# Patient Record
Sex: FEMALE | Race: WHITE | Employment: UNEMPLOYED | URBAN - METROPOLITAN AREA
[De-identification: names, ages, dates, MRNs, and addresses within clinical notes are randomized per-mention and may not be internally consistent; named-entity substitution may affect disease eponyms.]

---

## 2024-11-15 ENCOUNTER — HOSPITAL ENCOUNTER (INPATIENT)
Age: 29
LOS: 8 days | Discharge: HOME | DRG: 885 | End: 2024-11-23
Attending: STUDENT IN AN ORGANIZED HEALTH CARE EDUCATION/TRAINING PROGRAM | Admitting: PSYCHIATRY & NEUROLOGY
Payer: MEDICARE

## 2024-11-15 DIAGNOSIS — R45.851 SUICIDAL IDEATION: Primary | ICD-10-CM

## 2024-11-15 PROBLEM — F25.9 SCHIZO AFFECTIVE SCHIZOPHRENIA (HCC): Status: ACTIVE | Noted: 2024-11-15

## 2024-11-15 LAB
ALBUMIN SERPL-MCNC: 4 G/DL (ref 3.5–5.2)
ALP SERPL-CCNC: 63 U/L (ref 35–104)
ALT SERPL-CCNC: 54 U/L (ref 0–32)
AMPHET UR QL SCN: NEGATIVE
ANION GAP SERPL CALCULATED.3IONS-SCNC: 6 MMOL/L (ref 7–16)
APAP SERPL-MCNC: <5 UG/ML (ref 10–30)
AST SERPL-CCNC: 19 U/L (ref 0–31)
BACTERIA URNS QL MICRO: ABNORMAL
BARBITURATES UR QL SCN: NEGATIVE
BASOPHILS # BLD: 0.05 K/UL (ref 0–0.2)
BASOPHILS NFR BLD: 1 % (ref 0–2)
BENZODIAZ UR QL: NEGATIVE
BILIRUB DIRECT SERPL-MCNC: <0.2 MG/DL (ref 0–0.3)
BILIRUB INDIRECT SERPL-MCNC: ABNORMAL MG/DL (ref 0–1)
BILIRUB SERPL-MCNC: 0.2 MG/DL (ref 0–1.2)
BILIRUB UR QL STRIP: NEGATIVE
BUN SERPL-MCNC: 6 MG/DL (ref 6–20)
BUPRENORPHINE UR QL: NEGATIVE
CALCIUM SERPL-MCNC: 9 MG/DL (ref 8.6–10.2)
CANNABINOIDS UR QL SCN: NEGATIVE
CHLORIDE SERPL-SCNC: 104 MMOL/L (ref 98–107)
CLARITY UR: ABNORMAL
CO2 SERPL-SCNC: 30 MMOL/L (ref 22–29)
COCAINE UR QL SCN: NEGATIVE
COLOR UR: YELLOW
CREAT SERPL-MCNC: 0.7 MG/DL (ref 0.5–1)
EOSINOPHIL # BLD: 0.45 K/UL (ref 0.05–0.5)
EOSINOPHILS RELATIVE PERCENT: 6 % (ref 0–6)
EPI CELLS #/AREA URNS HPF: ABNORMAL /HPF
ERYTHROCYTE [DISTWIDTH] IN BLOOD BY AUTOMATED COUNT: 13.8 % (ref 11.5–15)
ETHANOLAMINE SERPL-MCNC: <10 MG/DL (ref 0–0.08)
FENTANYL UR QL: NEGATIVE
GFR, ESTIMATED: >90 ML/MIN/1.73M2
GLUCOSE SERPL-MCNC: 88 MG/DL (ref 74–99)
GLUCOSE UR STRIP-MCNC: NEGATIVE MG/DL
HCG, URINE, POC: NEGATIVE
HCT VFR BLD AUTO: 43.3 % (ref 34–48)
HGB BLD-MCNC: 14.1 G/DL (ref 11.5–15.5)
HGB UR QL STRIP.AUTO: ABNORMAL
IMM GRANULOCYTES # BLD AUTO: <0.03 K/UL (ref 0–0.58)
IMM GRANULOCYTES NFR BLD: 0 % (ref 0–5)
KETONES UR STRIP-MCNC: NEGATIVE MG/DL
LEUKOCYTE ESTERASE UR QL STRIP: NEGATIVE
LYMPHOCYTES NFR BLD: 3.21 K/UL (ref 1.5–4)
LYMPHOCYTES RELATIVE PERCENT: 43 % (ref 20–42)
Lab: NORMAL
MCH RBC QN AUTO: 28.5 PG (ref 26–35)
MCHC RBC AUTO-ENTMCNC: 32.6 G/DL (ref 32–34.5)
MCV RBC AUTO: 87.5 FL (ref 80–99.9)
METHADONE UR QL: NEGATIVE
MONOCYTES NFR BLD: 0.49 K/UL (ref 0.1–0.95)
MONOCYTES NFR BLD: 7 % (ref 2–12)
NEGATIVE QC PASS/FAIL: NORMAL
NEUTROPHILS NFR BLD: 43 % (ref 43–80)
NEUTS SEG NFR BLD: 3.21 K/UL (ref 1.8–7.3)
NITRITE UR QL STRIP: NEGATIVE
OPIATES UR QL SCN: NEGATIVE
OXYCODONE UR QL SCN: NEGATIVE
PCP UR QL SCN: NEGATIVE
PH UR STRIP: 7 [PH] (ref 5–9)
PLATELET # BLD AUTO: 196 K/UL (ref 130–450)
PMV BLD AUTO: 12.2 FL (ref 7–12)
POSITIVE QC PASS/FAIL: NORMAL
POTASSIUM SERPL-SCNC: 3.7 MMOL/L (ref 3.5–5)
PROT SERPL-MCNC: 6.1 G/DL (ref 6.4–8.3)
PROT UR STRIP-MCNC: NEGATIVE MG/DL
RBC # BLD AUTO: 4.95 M/UL (ref 3.5–5.5)
RBC #/AREA URNS HPF: ABNORMAL /HPF
SALICYLATES SERPL-MCNC: <0.3 MG/DL (ref 0–30)
SODIUM SERPL-SCNC: 140 MMOL/L (ref 132–146)
SP GR UR STRIP: 1.01 (ref 1–1.03)
TEST INFORMATION: NORMAL
TOXIC TRICYCLIC SC,BLOOD: NEGATIVE
UROBILINOGEN UR STRIP-ACNC: 0.2 EU/DL (ref 0–1)
WBC #/AREA URNS HPF: ABNORMAL /HPF
WBC OTHER # BLD: 7.4 K/UL (ref 4.5–11.5)

## 2024-11-15 PROCEDURE — 80179 DRUG ASSAY SALICYLATE: CPT

## 2024-11-15 PROCEDURE — 80307 DRUG TEST PRSMV CHEM ANLYZR: CPT

## 2024-11-15 PROCEDURE — 80053 COMPREHEN METABOLIC PANEL: CPT

## 2024-11-15 PROCEDURE — G0480 DRUG TEST DEF 1-7 CLASSES: HCPCS

## 2024-11-15 PROCEDURE — 85025 COMPLETE CBC W/AUTO DIFF WBC: CPT

## 2024-11-15 PROCEDURE — 6370000000 HC RX 637 (ALT 250 FOR IP): Performed by: NURSE PRACTITIONER

## 2024-11-15 PROCEDURE — 93005 ELECTROCARDIOGRAM TRACING: CPT

## 2024-11-15 PROCEDURE — 6370000000 HC RX 637 (ALT 250 FOR IP): Performed by: STUDENT IN AN ORGANIZED HEALTH CARE EDUCATION/TRAINING PROGRAM

## 2024-11-15 PROCEDURE — 80143 DRUG ASSAY ACETAMINOPHEN: CPT

## 2024-11-15 PROCEDURE — 81001 URINALYSIS AUTO W/SCOPE: CPT

## 2024-11-15 PROCEDURE — 99285 EMERGENCY DEPT VISIT HI MDM: CPT

## 2024-11-15 PROCEDURE — 90792 PSYCH DIAG EVAL W/MED SRVCS: CPT | Performed by: NURSE PRACTITIONER

## 2024-11-15 PROCEDURE — 1240000000 HC EMOTIONAL WELLNESS R&B

## 2024-11-15 PROCEDURE — 82248 BILIRUBIN DIRECT: CPT

## 2024-11-15 RX ORDER — HALOPERIDOL 5 MG/ML
5 INJECTION INTRAMUSCULAR EVERY 6 HOURS PRN
Status: DISCONTINUED | OUTPATIENT
Start: 2024-11-15 | End: 2024-11-16

## 2024-11-15 RX ORDER — LORAZEPAM 1 MG/1
2 TABLET ORAL ONCE
Status: COMPLETED | OUTPATIENT
Start: 2024-11-15 | End: 2024-11-15

## 2024-11-15 RX ORDER — HYDROXYZINE HYDROCHLORIDE 50 MG/1
50 TABLET, FILM COATED ORAL 3 TIMES DAILY PRN
Status: DISCONTINUED | OUTPATIENT
Start: 2024-11-15 | End: 2024-11-16

## 2024-11-15 RX ORDER — HALOPERIDOL 5 MG/1
5 TABLET ORAL EVERY 6 HOURS PRN
Status: DISCONTINUED | OUTPATIENT
Start: 2024-11-15 | End: 2024-11-16

## 2024-11-15 RX ORDER — MAGNESIUM HYDROXIDE/ALUMINUM HYDROXICE/SIMETHICONE 120; 1200; 1200 MG/30ML; MG/30ML; MG/30ML
30 SUSPENSION ORAL PRN
Status: DISCONTINUED | OUTPATIENT
Start: 2024-11-15 | End: 2024-11-16

## 2024-11-15 RX ORDER — ACETAMINOPHEN 325 MG/1
650 TABLET ORAL EVERY 6 HOURS PRN
Status: DISCONTINUED | OUTPATIENT
Start: 2024-11-15 | End: 2024-11-16

## 2024-11-15 RX ORDER — NICOTINE 21 MG/24HR
1 PATCH, TRANSDERMAL 24 HOURS TRANSDERMAL DAILY
Status: DISCONTINUED | OUTPATIENT
Start: 2024-11-16 | End: 2024-11-17

## 2024-11-15 RX ORDER — NICOTINE 21 MG/24HR
1 PATCH, TRANSDERMAL 24 HOURS TRANSDERMAL ONCE
Status: COMPLETED | OUTPATIENT
Start: 2024-11-15 | End: 2024-11-16

## 2024-11-15 RX ADMIN — ACETAMINOPHEN 650 MG: 325 TABLET ORAL at 16:37

## 2024-11-15 RX ADMIN — LORAZEPAM 2 MG: 1 TABLET ORAL at 14:18

## 2024-11-15 ASSESSMENT — PAIN - FUNCTIONAL ASSESSMENT: PAIN_FUNCTIONAL_ASSESSMENT: NONE - DENIES PAIN

## 2024-11-15 ASSESSMENT — LIFESTYLE VARIABLES
HOW OFTEN DO YOU HAVE A DRINK CONTAINING ALCOHOL: NEVER
HOW MANY STANDARD DRINKS CONTAINING ALCOHOL DO YOU HAVE ON A TYPICAL DAY: PATIENT DOES NOT DRINK

## 2024-11-15 NOTE — VIRTUAL HEALTH
Jayne Tate, was evaluated through a synchronous (real-time) audio-video encounter. The patient (and/or guardian if applicable) is aware that this is a billable service, which includes applicable co-pays. This virtual visit was conducted with patient's (and/or legal guardian's) consent. Patient identification was verified, and a caregiver was present when appropriate.  The patient was located at Facility (Appt Department): [facility] EMERGENCY DEPARTMENT  [address] [id number]  Loc: [phone]  The provider was located at Home (City/State): [place]   Confirm you are appropriately licensed, registered, or certified to deliver care in the state where the patient is located as indicated above. If you are not or unsure, please re-schedule the visit: Yes, I confirm.   "[employer] Consult to Tele-Psych  Consult performed by: Elena [place], DARIA - CNP  Consult ordered by: Eliu Emerson MD Kristin Winklevoss  [id number]  1995     EMERGENCY DEPARTMENT TELEPSYCHIATRY EVALUATION    11/15/24    Chief Complaint:  “I am not doing well ”  HPI: Patient is a 29 y.o.  female who presents for psychiatric evaluation. Patient presented to the ED on 11/15/24 from [facility]. The patient states\" I am doing not very well. I fell like I am losing my mind. I am suicidal. My meds are not working, I think they need adjusted.\" The patient reports she feels like her medications have not been working for the past month. She has had suicidal thoughts for the past week. She endorses a plan to use her brothers gun to shoot herself. She states she knows where his gun is and has access to it. The patient reports a history of schizoaffective disorder, bipolar type. She receives the invega injection and her next injection is on December 5th. She is also prescribed Trazodone and Zoloft. The patient is unable to state the name of her

## 2024-11-15 NOTE — ED NOTES
Call to [facility] [phone].  Spoke to Shelley.  She authorized bed days.

## 2024-11-15 NOTE — ED NOTES
Call to [facility] [phone] - [place] med[place] Utilization review regarding patient's Medicaid.  Spoke to Delisa who reported that the patient's Medicaid is not eligible any longer and has not been eligible since March 2022.  Patient admits she has not applied for Medicaid recently.  Call to [facility] [phone].  Spoke to Quinn who stated someone will call back.

## 2024-11-15 NOTE — ED NOTES
Call to [facility] and spoke to Mary regarding the auth for bed days.  She stated that there will not be any female beds this weekend.

## 2024-11-15 NOTE — ED NOTES
Paperwork faxed to [facility] crisis at [phone].  Also [facility] fax[facility] at [phone].  Call [facility] and spoke to Mary.  She reported that she got the paperwork and she would start working the patient up while awaiting authorization from [facility].  She also reported that there are no female beds and there probably will not be any female beds until Monday morning.

## 2024-11-16 PROCEDURE — 90791 PSYCH DIAGNOSTIC EVALUATION: CPT | Performed by: SOCIAL WORKER

## 2024-11-16 PROCEDURE — 6370000000 HC RX 637 (ALT 250 FOR IP): Performed by: NURSE PRACTITIONER

## 2024-11-16 PROCEDURE — 1240000000 HC EMOTIONAL WELLNESS R&B

## 2024-11-16 RX ADMIN — HYDROXYZINE HYDROCHLORIDE 50 MG: 50 TABLET, FILM COATED ORAL at 11:48

## 2024-11-16 NOTE — ED NOTES
PTS FAMILY MEMBER JIMENA # [phone] CALLED REQUESTING INFORMATION INFORMED WILL HAVE PT CALL HER BACK TO DISCUSS

## 2024-11-16 NOTE — VIRTUAL HEALTH
Telepsych  Crisis 24-Hour Reassessment       Chief Complaint: Sucidal Ideation    C-SSRS Screening Completed: Completed      Current Suicide Risk (Low, Moderate, High): High    Mental Status Exam:     Sensorium  oriented to time, place and person   Orientation person, place, time/date, and situation   Relations cooperative   Eye Contact appropriate   Appearance:  age appropriate   Motor Behavior:  within normal limits   Speech:  soft   Vocabulary average   Thought Process: rapid   Thought Content suicidal   Suicidal ideations plan and intention   Homicidal ideations plan and intention   Mood:  anxious   Affect:  normal and mood-congruent   Memory recent  adequate   Memory remote:  adequate   Concentration:  adequate   Abstraction:  concrete   Insight:  fair   Reliability poor   Judgment:  limited       Updated Collateral: no new collateral      Brief Summary:Pt presenting with suicidal thoughts with plan to shoot herself with her brothers gun. She admits to feeling like her medications have not been working for the past month. She has been feeling suicidal for the past week.     Writer talked to the patient, patein reported that she is still feeling worse today than she felt yesterday, and reported that she is not able to keep herself safe. Patient reported that her Sucidal ideation has become to much.     Updated Level of Care/Disposition:  placement accepted, bed pending.    Safety Plan (Developed/Reviewed):  completed     Dx:   Sucidal ideation, homicidal ideation     Jayne Ybarra, was evaluated through a synchronous (real-time) audio-video encounter. The patient (and/or guardian if applicable) is aware that this is a billable service, which includes applicable co-pays. This virtual visit was conducted with patient's (and/or legal guardian's) consent. Patient identification was verified, and a caregiver was present when appropriate.  The patient was located at Facility (Appt Department): [facility]

## 2024-11-17 PROCEDURE — 90791 PSYCH DIAGNOSTIC EVALUATION: CPT | Performed by: SOCIAL WORKER

## 2024-11-17 PROCEDURE — 6370000000 HC RX 637 (ALT 250 FOR IP): Performed by: EMERGENCY MEDICINE

## 2024-11-17 PROCEDURE — 6370000000 HC RX 637 (ALT 250 FOR IP): Performed by: STUDENT IN AN ORGANIZED HEALTH CARE EDUCATION/TRAINING PROGRAM

## 2024-11-17 PROCEDURE — 1240000000 HC EMOTIONAL WELLNESS R&B

## 2024-11-17 RX ORDER — POLYETHYLENE GLYCOL 3350 17 G
2 POWDER IN PACKET (EA) ORAL
Status: DISCONTINUED | OUTPATIENT
Start: 2024-11-17 | End: 2024-11-23 | Stop reason: HOSPADM

## 2024-11-17 RX ORDER — LORAZEPAM 0.5 MG/1
0.5 TABLET ORAL ONCE
Status: COMPLETED | OUTPATIENT
Start: 2024-11-17 | End: 2024-11-17

## 2024-11-17 RX ORDER — ACETAMINOPHEN 325 MG/1
650 TABLET ORAL ONCE
Status: COMPLETED | OUTPATIENT
Start: 2024-11-17 | End: 2024-11-17

## 2024-11-17 RX ORDER — HYDROXYZINE PAMOATE 50 MG/1
50 CAPSULE ORAL ONCE
Status: COMPLETED | OUTPATIENT
Start: 2024-11-17 | End: 2024-11-17

## 2024-11-17 RX ORDER — NICOTINE 21 MG/24HR
1 PATCH, TRANSDERMAL 24 HOURS TRANSDERMAL DAILY
Status: DISCONTINUED | OUTPATIENT
Start: 2024-11-17 | End: 2024-11-17

## 2024-11-17 RX ORDER — POLYETHYLENE GLYCOL 3350 17 G
2 POWDER IN PACKET (EA) ORAL
Status: DISCONTINUED | OUTPATIENT
Start: 2024-11-17 | End: 2024-11-17

## 2024-11-17 RX ORDER — TRAZODONE HYDROCHLORIDE 50 MG/1
50 TABLET, FILM COATED ORAL ONCE
Status: COMPLETED | OUTPATIENT
Start: 2024-11-17 | End: 2024-11-17

## 2024-11-17 RX ADMIN — LORAZEPAM 0.5 MG: 0.5 TABLET ORAL at 21:45

## 2024-11-17 RX ADMIN — HYDROXYZINE PAMOATE 50 MG: 50 CAPSULE ORAL at 10:05

## 2024-11-17 RX ADMIN — NICOTINE POLACRILEX 2 MG: 2 LOZENGE ORAL at 16:02

## 2024-11-17 RX ADMIN — NICOTINE POLACRILEX 2 MG: 2 LOZENGE ORAL at 12:38

## 2024-11-17 RX ADMIN — NICOTINE POLACRILEX 2 MG: 2 LOZENGE ORAL at 17:47

## 2024-11-17 RX ADMIN — TRAZODONE HYDROCHLORIDE 50 MG: 50 TABLET ORAL at 21:45

## 2024-11-17 RX ADMIN — ACETAMINOPHEN 650 MG: 325 TABLET ORAL at 10:04

## 2024-11-17 RX ADMIN — NICOTINE POLACRILEX 2 MG: 2 LOZENGE ORAL at 13:52

## 2024-11-17 RX ADMIN — NICOTINE POLACRILEX 2 MG: 2 LOZENGE ORAL at 21:25

## 2024-11-17 ASSESSMENT — PAIN SCALES - GENERAL: PAINLEVEL_OUTOF10: 7

## 2024-11-17 ASSESSMENT — PAIN - FUNCTIONAL ASSESSMENT
PAIN_FUNCTIONAL_ASSESSMENT: NONE - DENIES PAIN
PAIN_FUNCTIONAL_ASSESSMENT: NONE - DENIES PAIN

## 2024-11-17 ASSESSMENT — PAIN DESCRIPTION - DESCRIPTORS: DESCRIPTORS: SHOOTING

## 2024-11-17 ASSESSMENT — PAIN DESCRIPTION - LOCATION: LOCATION: BACK

## 2024-11-17 ASSESSMENT — PAIN DESCRIPTION - ORIENTATION: ORIENTATION: LEFT;LOWER

## 2024-11-17 NOTE — DISCHARGE INSTRUCTIONS
[facility]   [email or web address]  [address] [place] [address]  [place] [address]  P: [phone]  F: [phone]  E: [email or web address]    [facility]   Transitional housing services   call [phone]- [phone] or Toll Free: [phone].     [facility]  [address]  [place] [address]  Phone: [phone]    Follow up for Tobacco Cessation at:    [facility] Tobacco Treatment                                 Date:  Friday 11/29 at 10am              [address]    [place], [place] [address]   (Inside [facility]    take B elevators to 7th floor)   Phone: [phone]   Fax: [phone]     RESOURCES FOR HOMELESS:   [facility]- [address] [facility] Males [phone] Females [phone]   [place]- before [address] [place], [place] [address] [phone]     after 4 PM  [address] [place] [address]   [facility]- [address] [place] [address] [phone]   [facility] Housing- [address] [place] [address] [phone]   [facility]- [address] [place] [address] [phone]   [facility]- [address] [facility] [address] [phone]   [facility]- [address] [place], [place] [address] [phone]   [facility]- [address], [place] [address] [phone]   [facility] [phone]   [facility]eless shelter- Phone: [phone]   [facility] [place]- [phone]   [facility] [phone] [address] Homeless Shelter- [phone]   [facility] homeless outreach program- [phone]   [place] [facility] for Women [facility] [phone]   [facility] [place] [facility] for men [facility] [phone]   [facility]- [phone]   [facility] [address]  [facility] [phone]   [facility] - Transitional Housing [facility] Residents [address] [facility] [address] Phone: [phone]   [facility] Phone: [phone] [address], [place] [address]    [facility] Phone: [phone]   Support , Nicolette Carrasquillo Phone: [phone]    [facility] [address] [facility] [address] Phone: [phone]   [facility] [address] [facility] [address] Phone: [phone]       Cheap Motels in the area:   [employer] [facility]- [place] [phone]: night-$50, week-$225, month-$750   [employer]- [address] [place] [address] [phone]: night- $80.31, week-$321   [employer] [facility]- [address] , [place] [address] [phone]: night- $52.88, week-$326   [place]- [address] [place] [address] [phone]: night- $75, week-$255   [facility] by [facility] [address], [phone]   [employer] [facility]- [address], [place] [phone]    [facility]- [address] [place], [place] [address] [phone] or 211    [facility]   Address: [address] [place] [address]  Phone: [phone]      Please reach out to one of the following community providers for treatment and support.   Help Hotline: 211, 530.160.9804 or [phone]   [facility] Mental Health and Recovery Board [phone]   [facility] Mental Health and Recovery Board [phone]   [facility] Mental Health and Recovery Board [phone]   If you are in need of help and experiencing any barriers to care please contact help hotline 24 hours a day and/or your local board of mental health and recovery during normal business hours.    Adult Protective Services [facility] [phone] / [phone]  Adult Protective Services [facility] Tel:  [phone] (Office) Tel:  [phone]

## 2024-11-17 NOTE — ED NOTES
SW called [facility] ([phone]) and spoke with Mary regarding bed availability. There will not be any beds until tomorrow.     Pt notified.

## 2024-11-17 NOTE — ED NOTES
Pt instructed not to pace beyond room 26 d/t pt stated that the pt was making weird eye contact with her.

## 2024-11-17 NOTE — ED NOTES
SW checked in with pt. She expressed to be doing well, having some anxiety. Pt was updated that she was referred to [facility] but there were no beds as of last night. SW will continue to update pt when more information is available, pt receptive. ED nurse notified that pt is asking for medication for anxiety and a nicotine patch.

## 2024-11-17 NOTE — ED NOTES
Pt pacing every time rm 30 pacing when pt from room 30 request something pt also requesting same thing (ie crackers juice ecteraa)    no radiation

## 2024-11-17 NOTE — ED NOTES
Patient resting with eyes closed, respirations non-labored, no distress noted. Patient responds to voice. Patient answered \"yes\" when asked if she had any thoughts about killing self and then stated \"I don't want to talk about it\" when this RN asked if she had a plan and declined to answer any more questions

## 2024-11-17 NOTE — ED NOTES
Pt at  desk asking for assistance with a structured housing facility. She expressed having a difficult time with medication maintenance and does not like to live with anyone else. Pt reports to have lived at [facility], where she is unable to return within the year due to substance use. SW stated that she will include resources in her chart as pt can speak with staff at [facility] prior to discharge.     [facility]   [email or web address]  [address] [place] [address]  [place] [address]  P: [phone]  F: [phone]  E: [email or web address]    [facility]   Transitional housing services   call [phone] or Toll Free: [phone].     [facility]  [address] [facility]  [place] [address]  Phone: [phone]

## 2024-11-18 PROBLEM — R45.851 SUICIDAL IDEATION: Status: ACTIVE | Noted: 2024-11-18

## 2024-11-18 PROCEDURE — 6370000000 HC RX 637 (ALT 250 FOR IP): Performed by: NURSE PRACTITIONER

## 2024-11-18 PROCEDURE — 1240000000 HC EMOTIONAL WELLNESS R&B

## 2024-11-18 PROCEDURE — 6370000000 HC RX 637 (ALT 250 FOR IP): Performed by: STUDENT IN AN ORGANIZED HEALTH CARE EDUCATION/TRAINING PROGRAM

## 2024-11-18 PROCEDURE — 6370000000 HC RX 637 (ALT 250 FOR IP): Performed by: PSYCHIATRY & NEUROLOGY

## 2024-11-18 RX ORDER — SERTRALINE HYDROCHLORIDE 100 MG/1
100 TABLET, FILM COATED ORAL DAILY
Status: ON HOLD | COMMUNITY
End: 2024-11-22 | Stop reason: HOSPADM

## 2024-11-18 RX ORDER — HYDROXYZINE PAMOATE 50 MG/1
50 CAPSULE ORAL 3 TIMES DAILY PRN
Status: DISCONTINUED | OUTPATIENT
Start: 2024-11-18 | End: 2024-11-23 | Stop reason: HOSPADM

## 2024-11-18 RX ORDER — TRAZODONE HYDROCHLORIDE 50 MG/1
50 TABLET, FILM COATED ORAL NIGHTLY
COMMUNITY

## 2024-11-18 RX ORDER — NITROFURANTOIN 25; 75 MG/1; MG/1
100 CAPSULE ORAL 2 TIMES DAILY
Status: ON HOLD | COMMUNITY
End: 2024-11-22 | Stop reason: HOSPADM

## 2024-11-18 RX ORDER — MAGNESIUM HYDROXIDE/ALUMINUM HYDROXICE/SIMETHICONE 120; 1200; 1200 MG/30ML; MG/30ML; MG/30ML
30 SUSPENSION ORAL EVERY 6 HOURS PRN
Status: DISCONTINUED | OUTPATIENT
Start: 2024-11-18 | End: 2024-11-23 | Stop reason: HOSPADM

## 2024-11-18 RX ORDER — ACETAMINOPHEN 325 MG/1
650 TABLET ORAL EVERY 4 HOURS PRN
Status: DISCONTINUED | OUTPATIENT
Start: 2024-11-18 | End: 2024-11-23 | Stop reason: HOSPADM

## 2024-11-18 RX ORDER — GABAPENTIN 400 MG/1
400 CAPSULE ORAL 3 TIMES DAILY
Status: ON HOLD | COMMUNITY
End: 2024-11-22 | Stop reason: HOSPADM

## 2024-11-18 RX ADMIN — NICOTINE POLACRILEX 2 MG: 2 LOZENGE ORAL at 17:01

## 2024-11-18 RX ADMIN — NICOTINE POLACRILEX 2 MG: 2 LOZENGE ORAL at 08:30

## 2024-11-18 RX ADMIN — Medication 3 MG: at 20:45

## 2024-11-18 RX ADMIN — ACETAMINOPHEN 650 MG: 325 TABLET ORAL at 21:04

## 2024-11-18 RX ADMIN — NICOTINE POLACRILEX 2 MG: 2 LOZENGE ORAL at 18:59

## 2024-11-18 RX ADMIN — NICOTINE POLACRILEX 2 MG: 2 LOZENGE ORAL at 14:52

## 2024-11-18 RX ADMIN — NICOTINE POLACRILEX 2 MG: 2 LOZENGE ORAL at 10:35

## 2024-11-18 RX ADMIN — NICOTINE POLACRILEX 2 MG: 2 LOZENGE ORAL at 13:12

## 2024-11-18 RX ADMIN — NICOTINE POLACRILEX 2 MG: 2 LOZENGE ORAL at 21:04

## 2024-11-18 RX ADMIN — HYDROXYZINE PAMOATE 50 MG: 50 CAPSULE ORAL at 20:45

## 2024-11-18 ASSESSMENT — PAIN SCALES - GENERAL
PAINLEVEL_OUTOF10: 0
PAINLEVEL_OUTOF10: 0
PAINLEVEL_OUTOF10: 3

## 2024-11-18 ASSESSMENT — LIFESTYLE VARIABLES
HOW OFTEN DO YOU HAVE A DRINK CONTAINING ALCOHOL: NEVER
HOW OFTEN DO YOU HAVE A DRINK CONTAINING ALCOHOL: NEVER
HOW MANY STANDARD DRINKS CONTAINING ALCOHOL DO YOU HAVE ON A TYPICAL DAY: PATIENT DOES NOT DRINK
HOW MANY STANDARD DRINKS CONTAINING ALCOHOL DO YOU HAVE ON A TYPICAL DAY: PATIENT DOES NOT DRINK

## 2024-11-18 ASSESSMENT — SLEEP AND FATIGUE QUESTIONNAIRES
AVERAGE NUMBER OF SLEEP HOURS: 6
DO YOU HAVE DIFFICULTY SLEEPING: NO
AVERAGE NUMBER OF SLEEP HOURS: 6
DO YOU USE A SLEEP AID: NO
DO YOU HAVE DIFFICULTY SLEEPING: NO
DO YOU USE A SLEEP AID: NO

## 2024-11-18 ASSESSMENT — PATIENT HEALTH QUESTIONNAIRE - PHQ9
SUM OF ALL RESPONSES TO PHQ QUESTIONS 1-9: 3
SUM OF ALL RESPONSES TO PHQ QUESTIONS 1-9: 3
5. POOR APPETITE OR OVEREATING: SEVERAL DAYS
10. IF YOU CHECKED OFF ANY PROBLEMS, HOW DIFFICULT HAVE THESE PROBLEMS MADE IT FOR YOU TO DO YOUR WORK, TAKE CARE OF THINGS AT HOME, OR GET ALONG WITH OTHER PEOPLE: VERY DIFFICULT
2. FEELING DOWN, DEPRESSED OR HOPELESS: MORE THAN HALF THE DAYS
SUM OF ALL RESPONSES TO PHQ QUESTIONS 1-9: 14
SUM OF ALL RESPONSES TO PHQ QUESTIONS 1-9: 14
SUM OF ALL RESPONSES TO PHQ9 QUESTIONS 1 & 2: 5
SUM OF ALL RESPONSES TO PHQ QUESTIONS 1-9: 3
1. LITTLE INTEREST OR PLEASURE IN DOING THINGS: NEARLY EVERY DAY
8. MOVING OR SPEAKING SO SLOWLY THAT OTHER PEOPLE COULD HAVE NOTICED. OR THE OPPOSITE, BEING SO FIGETY OR RESTLESS THAT YOU HAVE BEEN MOVING AROUND A LOT MORE THAN USUAL: SEVERAL DAYS
SUM OF ALL RESPONSES TO PHQ9 QUESTIONS 1 & 2: 3
SUM OF ALL RESPONSES TO PHQ QUESTIONS 1-9: 3
1. LITTLE INTEREST OR PLEASURE IN DOING THINGS: MORE THAN HALF THE DAYS
3. TROUBLE FALLING OR STAYING ASLEEP: SEVERAL DAYS
9. THOUGHTS THAT YOU WOULD BE BETTER OFF DEAD, OR OF HURTING YOURSELF: SEVERAL DAYS
6. FEELING BAD ABOUT YOURSELF - OR THAT YOU ARE A FAILURE OR HAVE LET YOURSELF OR YOUR FAMILY DOWN: MORE THAN HALF THE DAYS
7. TROUBLE CONCENTRATING ON THINGS, SUCH AS READING THE NEWSPAPER OR WATCHING TELEVISION: SEVERAL DAYS
SUM OF ALL RESPONSES TO PHQ QUESTIONS 1-9: 14
2. FEELING DOWN, DEPRESSED OR HOPELESS: SEVERAL DAYS
SUM OF ALL RESPONSES TO PHQ QUESTIONS 1-9: 13
4. FEELING TIRED OR HAVING LITTLE ENERGY: MORE THAN HALF THE DAYS
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
## 2024-11-18 NOTE — ED NOTES
Patient requesting medication for anxiety and sleep. Patient states she has taken Trazodone before, states she does not know what the dose was but \"maybe it was 100mg\".
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
## 2024-11-18 NOTE — ED NOTES
Navigator placed phone call to [facility] [phone] and spoke with Central Nursing Office [place]. Cancelled referral due to insurance found.    Electronically signed by ROSALIE Gee, VERÓNICA on 11/18/2024 at 11:11 AM

## 2024-11-18 NOTE — PROGRESS NOTES
Pt recently at [facility] in [place], [place]. Discharge instructions in her belongings. Medication list updated. Macrobid for UTI never picked up from pharmacy.

## 2024-11-18 NOTE — ED NOTES
AZUL Supervisor discussed case with Psych NP Rhonda. Patient has been accepted for admission to [facility]. AZUL Supervisor called admitting and assigned patient to bed 7515A.    Telepsych consult for medication management has been canceled.     ROSALIE Danielle, JANESSA-S  Behavioral Health

## 2024-11-18 NOTE — PROGRESS NOTES
[facility] [place]  Admission Note     Pt denies SI, HI and AVH. Pt is focused on being homeless. Pt stated she had voices telling her to use cocaine 2 weeks ago and has been between living situations since. Pt stated her girlfriend kicked her out d/t \"I'm such a crazy psycho bitch.\"  Pt is somatic stating \"My vision is getting blurry now because I'm sad. My vision always gets that way when I'm sad.\" Pt's vision cleared for her to fill out menus and admission paperwork. Pt was kicked out of her group home for drug use. Pt wants placement. Pt is anxious and exaggerated. Oriented to room and unit. Will continue to monitor.       Admission Type:   Admission Type: Involuntary    Reason for admission:  Reason for Admission: \"I relapsed on crack cocaine. the voices talked me into it. Now i can't go back to the group home.\"      Addictive Behavior:   Addictive Behavior  In the Past 3 Months, Have You Felt or Has Someone Told You That You Have a Problem With  : None    Medical Problems:   Past Medical History:   Diagnosis Date    Asthma     Bipolar 1 disorder (HCC)     Schizo affective schizophrenia (HCC)        Status EXAM:  Mental Status and Behavioral Exam  Normal: No  Level of Assistance: Independent/Self  Facial Expression: Exaggerated, Worried  Affect: Incongruent  Level of Consciousness: Alert  Frequency of Checks: 4 times per hour, close  Mood:Normal: No  Mood: Anxious, Helpless  Motor Activity:Normal: Yes  Eye Contact: Good  Observed Behavior: Preoccupied, Friendly  Sexual Misconduct History: Current - no  Preception: [place] to person, [place] to time, [place] to place, [place] to situation  Attention:Normal: No  Attention: Distractible, Unable to concentrate  Thought Processes: Perseveration  Thought Content:Normal: No  Thought Content: Preoccupations  Depression Symptoms: Feelings of worthlessness, Feelings of hopelessess, Feelings of helplessness, Isolative  Anxiety Symptoms: Generalized  Caridad Symptoms:

## 2024-11-18 NOTE — PROGRESS NOTES
4 Eyes Skin Assessment     NAME:  Jayne Ybarra  YOB: 1995  MEDICAL RECORD NUMBER:  [id number]    The patient is being assessed for  Admission    I agree that at least one RN has performed a thorough Head to Toe Skin Assessment on the patient. ALL assessment sites listed below have been assessed.      Areas assessed by both nurses:    Head, Face, Ears, Shoulders, Back, Chest, Arms, Elbows, Hands, Sacrum. Buttock, Coccyx, Ischium, and Legs. Feet and Heels        Does the Patient have a Wound? No noted wound(s)       [place]khil Prevention initiated by RN: No  Wound Care Orders initiated by RN: No    Pressure Injury (Stage 3,4, Unstageable, DTI, NWPT, and Complex wounds) if present, place Wound referral order by RN under : No    New Ostomies, if present place, Ostomy referral order under : No     Nurse 1 eSignature: Electronically signed by Kenzie Dunn RN on 11/18/24 at 2:54 PM EST    **SHARE this note so that the co-signing nurse can place an eSignature**    Nurse 2 eSignature: {Esignature:[id number]}

## 2024-11-18 NOTE — ED NOTES
SW Supervisor reviewed pt's chart and Care Everywhere, found Medicare number [id number]. SW Supervisor called registration and provided Medicare number. Per Registration, patient has [employer] Medicare.    Once a bed is available, patient will be reviewed for admission to [facility] unit.    ROSALIE Danielle, JANESSA-S  Behavioral Health

## 2024-11-18 NOTE — PROGRESS NOTES
Pt receives YADAV of invega sustenna  at [facility] [phone]. Facility closed when this information was obtained. Will call tomorrow morning to obtain YADAV last dose.       Per [facility], pt received Invega Sustenna 156mg on 11/7/24.

## 2024-11-18 NOTE — VIRTUAL HEALTH
Telepsych  Crisis 24-Hour Reassessment       Chief Complaint: Sucidal ideation     C-SSRS Screening Completed: completed     Current Suicide Risk (Low, Moderate, High): high     Mental Status Exam:     Sensorium  oriented to time, place and person   Orientation person, place, time/date, and situation   Relations cooperative   Eye Contact appropriate   Appearance:  age appropriate   Motor Behavior:  within normal limits   Speech:  soft   Vocabulary average   Thought Process: rapid   Thought Content suicidal   Suicidal ideations plan and intention   Homicidal ideations plan and intention   Mood:  anxious   Affect:  normal and mood-congruent   Memory recent  adequate   Memory remote:  adequate   Concentration:  adequate   Abstraction:  concrete   Insight:  fair   Reliability poor   Judgment:  limited       Updated Collateral: no new collateral at this time.     Brief Summary: Writer talked to the patient, patient reported that she is feeling worse. Patient reported that she is feeling more Sucidal every day and she needs a medications adjustment. Patient reported that she is not able to keep herself safe outside of the hospital.     Patient reported after inpatient she wants to live in a facility called an \" [facility]\" and she reported that she is not able to function living on her own.     Updated Level of Care/Disposition:  Patient is in need of a psychiatric facility     Safety Plan (Developed/Reviewed): competed     Dx:   Sucidal ideation     Jayne Ybarra, was evaluated through a synchronous (real-time) audio-video encounter. The patient (and/or guardian if applicable) is aware that this is a billable service, which includes applicable co-pays. This virtual visit was conducted with patient's (and/or legal guardian's) consent. Patient identification was verified, and a caregiver was present when appropriate.  The patient was located at Facility (Appt Department): [facility]

## 2024-11-18 NOTE — VIRTUAL HEALTH
[place] Consult to Tele-Psych  Consult performed by: Stephanie Oscar APRN - CNP  Consult ordered by: Shahbaz Tomas MD  Reason for consult: Other    Reason for Cancel: TelePsych Reason for Cancel: Patient already admitted      Per AAKASH Bro consult no longer required. Patient will be admitted to onsite BH unit.

## 2024-11-18 NOTE — CARE COORDINATION
Patient approached AZUL Supervisor in [place]. She reported she is a resident of [facility] and was \"kicked out\" of [facility] housing ([facility]) approximately two weeks ago for smoking crack. She reported she has been staying with friends in the [facility] but does not want to return because they use drugs and she is trying to stay sober. Patient requested a referral to a mental health group home. SW Supervisor explained that because she is a [facility] resident, she will need to be placed in a group home or housing in that area. She verbalized understanding and reported that her Casemanager Farhana Harmon ([phone]) should be able to assist with group home placement. Patient reported if she cannot be placed in a group home, she is agreeable to discharging to the [facility] or returning to her friend's home. She reported she is banned from the [facility], therefore she cannot go there at discharge. SW Supervisor explained to patient that the inpatient Social Workers will meet with her to discuss discharge plan. Patient verbalized understanding.    ROSALIE Danielle, VINI  Behavioral Health

## 2024-11-18 NOTE — CARE COORDINATION
Biopsychosocial Assessment Note    Social work met with patient to complete the biopsychosocial assessment and C-SSRS.     Chief Complaint: Pt stated that she is currently in the hospital because \"I was suicidal at my CRR and I relapsed on crack because the voices convinced me to use them and now I'm homeless.\"     Mental Status Exam: Pt is alert and oriented x4. Pt's mood is anxious, affect is congruent. Pt's speech is pressured, rate is fast and volume is normal. Pt's eye contact is fair. Pt's thoughts process is preserved, thought content is preoccupied. Pt's memory is intact, pt is a fair historian. Pt's insight and judgement is poor. Pt was cooperative and friendly during assessment and offered relevant information after redirection. Pt denied current SI, HI, AVH.     Clinical Summary: Pt is a 29-year-old female, who presented to the ER due to increased depression with suicidal ideations. Per ED notes, \"presents for psychiatric evaluation. Patient presented to the ED on 11/15/24 from [facility]. The patient states\" I am doing not very well. I fell like I am losing my mind. I am suicidal. My meds are not working, I think they need adjusted.\"      Pt stated that she has a previous history of inpatient mental health hospitalizations with [facility] a few months ago. Pt stated that she is currently active with the [facility] and she has been compliant with her medications, however feels that they need to be adjusted or increased. Pt stated that she has been having suicidal thoughts and denied having any attempts to end her life. Pt stated that this was the first time she had a plan, to use her brother's gun to end her life. Pt reported a past history of self harm but denied this recently. Pt denied any history of trauma or abuse. Pt stated that her main stressor is that she is currently homeless and was staying with a friend, however this is not a good environment. Pt was preoccupied

## 2024-11-19 PROBLEM — F25.9 SCHIZO AFFECTIVE SCHIZOPHRENIA (HCC): Status: RESOLVED | Noted: 2024-11-15 | Resolved: 2024-11-19

## 2024-11-19 PROBLEM — F60.89 CLUSTER B PERSONALITY DISORDER (HCC): Status: ACTIVE | Noted: 2024-11-19

## 2024-11-19 PROBLEM — R45.851 SUICIDAL IDEATION: Status: RESOLVED | Noted: 2024-11-18 | Resolved: 2024-11-19

## 2024-11-19 PROBLEM — F25.0 SCHIZOAFFECTIVE DISORDER, BIPOLAR TYPE (HCC): Status: ACTIVE | Noted: 2024-11-19

## 2024-11-19 LAB
CHOLEST SERPL-MCNC: 173 MG/DL
HBA1C MFR BLD: 5.6 % (ref 4–5.6)
HDLC SERPL-MCNC: 30 MG/DL
LDLC SERPL CALC-MCNC: 91 MG/DL
TRIGL SERPL-MCNC: 261 MG/DL
VLDLC SERPL CALC-MCNC: 52 MG/DL

## 2024-11-19 PROCEDURE — 36415 COLL VENOUS BLD VENIPUNCTURE: CPT

## 2024-11-19 PROCEDURE — 1240000000 HC EMOTIONAL WELLNESS R&B

## 2024-11-19 PROCEDURE — 6370000000 HC RX 637 (ALT 250 FOR IP): Performed by: NURSE PRACTITIONER

## 2024-11-19 PROCEDURE — 83036 HEMOGLOBIN GLYCOSYLATED A1C: CPT

## 2024-11-19 PROCEDURE — 80061 LIPID PANEL: CPT

## 2024-11-19 PROCEDURE — 90792 PSYCH DIAG EVAL W/MED SRVCS: CPT | Performed by: NURSE PRACTITIONER

## 2024-11-19 PROCEDURE — 6370000000 HC RX 637 (ALT 250 FOR IP): Performed by: PSYCHIATRY & NEUROLOGY

## 2024-11-19 PROCEDURE — 6370000000 HC RX 637 (ALT 250 FOR IP): Performed by: STUDENT IN AN ORGANIZED HEALTH CARE EDUCATION/TRAINING PROGRAM

## 2024-11-19 RX ORDER — NITROFURANTOIN 25; 75 MG/1; MG/1
100 CAPSULE ORAL 2 TIMES DAILY
Status: DISCONTINUED | OUTPATIENT
Start: 2024-11-19 | End: 2024-11-23 | Stop reason: HOSPADM

## 2024-11-19 RX ORDER — TRAZODONE HYDROCHLORIDE 50 MG/1
50 TABLET, FILM COATED ORAL NIGHTLY
Status: DISCONTINUED | OUTPATIENT
Start: 2024-11-19 | End: 2024-11-23 | Stop reason: HOSPADM

## 2024-11-19 RX ORDER — OXCARBAZEPINE 300 MG/1
300 TABLET, FILM COATED ORAL 2 TIMES DAILY
Status: DISCONTINUED | OUTPATIENT
Start: 2024-11-19 | End: 2024-11-23 | Stop reason: HOSPADM

## 2024-11-19 RX ORDER — PALIPERIDONE 3 MG/1
3 TABLET, EXTENDED RELEASE ORAL DAILY
Status: DISCONTINUED | OUTPATIENT
Start: 2024-11-19 | End: 2024-11-23 | Stop reason: HOSPADM

## 2024-11-19 RX ADMIN — NICOTINE POLACRILEX 2 MG: 2 LOZENGE ORAL at 19:07

## 2024-11-19 RX ADMIN — NICOTINE POLACRILEX 2 MG: 2 LOZENGE ORAL at 14:04

## 2024-11-19 RX ADMIN — NITROFURANTOIN (MONOHYDRATE/MACROCRYSTALS) 100 MG: 75; 25 CAPSULE ORAL at 20:21

## 2024-11-19 RX ADMIN — HYDROXYZINE PAMOATE 50 MG: 50 CAPSULE ORAL at 17:03

## 2024-11-19 RX ADMIN — OXCARBAZEPINE 300 MG: 300 TABLET, FILM COATED ORAL at 14:04

## 2024-11-19 RX ADMIN — NICOTINE POLACRILEX 2 MG: 2 LOZENGE ORAL at 16:04

## 2024-11-19 RX ADMIN — Medication 3 MG: at 20:20

## 2024-11-19 RX ADMIN — NITROFURANTOIN (MONOHYDRATE/MACROCRYSTALS) 100 MG: 75; 25 CAPSULE ORAL at 10:11

## 2024-11-19 RX ADMIN — ACETAMINOPHEN 650 MG: 325 TABLET ORAL at 20:20

## 2024-11-19 RX ADMIN — NICOTINE POLACRILEX 2 MG: 2 LOZENGE ORAL at 09:33

## 2024-11-19 RX ADMIN — TRAZODONE HYDROCHLORIDE 50 MG: 50 TABLET ORAL at 20:21

## 2024-11-19 RX ADMIN — NICOTINE POLACRILEX 2 MG: 2 LOZENGE ORAL at 17:04

## 2024-11-19 RX ADMIN — METFORMIN HYDROCHLORIDE 1000 MG: 500 TABLET, FILM COATED ORAL at 16:58

## 2024-11-19 RX ADMIN — NICOTINE POLACRILEX 2 MG: 2 LOZENGE ORAL at 11:50

## 2024-11-19 RX ADMIN — NICOTINE POLACRILEX 2 MG: 2 LOZENGE ORAL at 20:23

## 2024-11-19 RX ADMIN — PALIPERIDONE 3 MG: 3 TABLET, EXTENDED RELEASE ORAL at 14:04

## 2024-11-19 RX ADMIN — OXCARBAZEPINE 300 MG: 300 TABLET, FILM COATED ORAL at 20:21

## 2024-11-19 ASSESSMENT — PAIN DESCRIPTION - LOCATION: LOCATION: GENERALIZED

## 2024-11-19 ASSESSMENT — PAIN DESCRIPTION - DESCRIPTORS: DESCRIPTORS: ACHING

## 2024-11-19 ASSESSMENT — PAIN SCALES - GENERAL
PAINLEVEL_OUTOF10: 6
PAINLEVEL_OUTOF10: 0

## 2024-11-19 NOTE — PROGRESS NOTES
Spiritual Health History and Assessment/Progress Note  VIRGEN [place]    Pt is calm, she did not voice concerns during our visit, she is passive today. When she is asked about her spiritual connection she reports not really having a spiritual support system. She has parents. She does not seem interested in talking today. I asked if she would like prayer but she reports no. We ended today's session. I informed her that if she had any further questions or needs from spiritual care we will visit.   (P) Initial Encounter, Behavioral Health,  ,  , (P) Initial Encounter    Name: Jayne Ybarra MRN: [id number]    Age: 29 y.o.     Sex: female   Language: English   Pentecostalism: Unknown   Schizo affective schizophrenia (HCC)     Date: 11/18/2024                           Spiritual Assessment began in [facility] ACUTE  1        Referral/Consult From: (P) Nurse   Encounter Overview/Reason: (P) Initial Encounter, Behavioral Health  Service Provided For: (P) Patient    Lauren, Belief, Meaning:   Patient identifies as spiritual and has beliefs or practices that help with coping during difficult times  Family/Friends No family/friends present      Importance and Influence:  Patient has spiritual/personal beliefs that influence decisions regarding their health  Family/Friends No family/friends present    Community:  Patient feels well-supported. Support system includes: Parent/s  Family/Friends No family/friends present    Assessment and Plan of Care:     Patient Interventions include: Facilitated expression of thoughts and feelings, Explored spiritual coping/struggle/distress, and Affirmed coping skills/support systems  Family/Friends Interventions include: No family/friends present    Patient Plan of Care: Spiritual Care available upon further referral  Family/Friends Plan of Care: No family/friends present    Electronically signed by Chaplain Jared on 11/18/2024 at 7:58 PM

## 2024-11-19 NOTE — PLAN OF CARE
Problem: Self Harm/Suicidality  Goal: Will have no self-injury during hospital stay  Description: INTERVENTIONS:  1.  Ensure constant observer at bedside with Q15M safety checks  2.  Maintain a safe environment  3.  Secure patient belongings  4.  Ensure family/visitors adhere to safety recommendations  5.  Ensure safety tray has been added to patient's diet order  6.  Every shift and PRN: Re-assess suicidal risk via Frequent Screener    Outcome: Progressing     Problem: Depression  Goal: Will be euthymic at discharge  Description: INTERVENTIONS:  1. Administer medication as ordered  2. Provide emotional support via 1:1 interaction with staff  3. Encourage involvement in milieu/groups/activities  4. Monitor for social isolation  Outcome: Progressing     Problem: Behavior  Goal: Pt/Family maintain appropriate behavior and adhere to behavioral management agreement, if implemented  Description: INTERVENTIONS:  1. Assess patient/family's coping skills and  non-compliant behavior (including use of illegal substances)  2. Notify security of behavior or suspected illegal substances which indicate the need for search of the family and/or belongings  3. Encourage verbalization of thoughts and concerns in a socially appropriate manner  4. Utilize positive, consistent limit setting strategies supporting safety of patient, staff and others  5. Encourage participation in the decision making process about the behavioral management agreement  6. If a visitor's behavior poses a threat to safety call refer to organization policy.  7. Initiate consult with , Psychosocial CNS, Spiritual Care as appropriate  Outcome: Progressing     Problem: Anxiety  Goal: Will report anxiety at manageable levels  Description: INTERVENTIONS:  1. Administer medication as ordered  2. Teach and rehearse alternative coping skills  3. Provide emotional support with 1:1 interaction with staff  Outcome: Progressing     Pt positive for fleeting

## 2024-11-19 NOTE — CARE COORDINATION
Pt requested to talk with SW. Pt reports she was just told by staff SW will find her someplace safe to stay at time of discharge. Pt stated she does not have any friends to stay with. Pt is open to going to the [place] homeless shelter however she is concerned with them not having a bed. Pt reports her sister might let her stay with her until she can go to a group home. Pt stated she prefers to go to a group home at time of discharge. Pt agreeable to SW calling her  to discuss discharge options. Pt is concerned with having to sleep out in the cold.     SW contacted the [facility] [phone] and verified the pt is not on any restrictions. SW was advised to call a day or two before discharge to check on bed availability. At this time they are full.      AZUL contacted pt's  Farhana Harmon ([phone]). Farhana reports the pt just got kicked out of their community counseling [facility] group home. She reports they do not have any other mental health agencies in [facility] and they do not have any homeless shelters. Farhana reports the [facility] shelter is in [place] but she heard they have been full for a long time. Farhana reports the pt was sent to the [facility] because they did not have any other resources for the pt. She reports the pt's family is burnt out with her behaviors and the situations she gets herself into.     AZUL contacted the [facility] [phone] and verified the pt is on a 30 day restriction \"for being found masturbating in a room with 5 other women\" until December 15.

## 2024-11-19 NOTE — PROGRESS NOTES
Pt called [facility] in [place] and stated it is only $325 per week to stay there and she has the money to cover that until her check comes in Dec 3rd.

## 2024-11-19 NOTE — H&P
concentration while talking to her, but good attention being able to spell world forwards and backwards. She did not want to do the serial 7s testing. Her insight and judgment are fair, but she does have poor impulse control. Her mood is incongruent with her affect, with her mood being \"good\" and her affect being blunted and flat. She is alert and oriented x3.      Past psychiatric history: The patient has a history of inpatient hospitalization for her mental health a couple of months ago and also goes to [facility] for outpatient treatment. She is currently not in therapy. She is diagnosed with schizoaffective disorder, bipolar type for which she gets monthly invega injections. She is also prescribed 50 mg Trazodone nightly and 100 mg Zoloft daily. She takes lorazepam as needed. No prior history of suicidal or homicidal ideation.      Family psychiatric history: Patient does not know.      Legal history: She has a history of being arrested for indecent exposure and disorderly conduct on 11/7/2024 and also has a history of trespassing.      Substance abuse history: History of alcohol and cocaine abuse with recent relapse. Also notes an additional prior episode of cocaine relapse.      Personal family and social history: 29 year old female who was born in [place], [place]. She is currently homeless and unemployed. She has a daughter who is currently with the father. She has friends, but wants to avoid being with them because they use drugs. Her sister and her dad are her support system. She has 6 siblings. Her brother has a shotgun that she knows where it is and was planning to use it to commit suicide. She does not have any history of being physically or sexually abused. Her stressors include her recent homelessness and being kicked out of her group home.      Past Medical History:        Diagnosis Date    Asthma     Bipolar 1 disorder (HCC)     Schizo affective schizophrenia (HCC)   X:     xNormal gag reflex and phonation   CN XI:   xShoulder shrug and neck rotation is normal  CNXII:    xTongue is midline no deviation or atrophy    Mental Status Examination:    Level of consciousness:  awake and lethargic   Appearance:  well-appearing, hospital attire, lying in bed, fair grooming, and fair hygiene  Behavior/Motor:  no abnormalities noted  Attitude toward examiner:  cooperative, attentive, good eye contact, and withdrawn  Speech:  slow and monotone   Mood: \"good\"  Affect:  blunted, flat, and mood incongruent  Thought processes:  linear and coherent   Thought content:  Suicidal Ideation:  active and with plan to use brother's gun  Perceptual Disturbance:  illusions, auditory and visual. She mentions that she sees \"things moving in her vision\" and hears voices but is unsure what they are talking about  Cognition:  oriented to person, place, and time   Concentration: distractible  Memory: impaired recent memory  Insight: fair   Judgement: fair   Fund of Knowledge: adequate      DIAGNOSIS:  Schizoaffective disorder bipolar type  Cluster B personality disorder          LABS: REVIEWED TODAY:  No results for input(s): \"WBC\", \"HGB\", \"PLT\" in the last 72 hours.  No results for input(s): \"NA\", \"K\", \"CL\", \"CO2\", \"BUN\", \"CREATININE\", \"GLUCOSE\" in the last 72 hours.  No results for input(s): \"BILITOT\", \"ALKPHOS\", \"AST\", \"ALT\" in the last 72 hours.  Lab Results   Component Value Date/Time    BARBSCNU NEGATIVE 11/15/2024 12:00 PM    LABBENZ NEGATIVE 11/15/2024 12:00 PM    LABMETH NEGATIVE 11/15/2024 12:00 PM    ETOH <10 11/15/2024 12:00 PM     No results found for: \"TSH\", \"FREET4\"  No results found for: \"LITHIUM\"  No results found for: \"VALPROATE\", \"CBMZ\"  No results found for: \"LITHIUM\", \"VALPROATE\"      Radiology No results found.      TREATMENT PLAN:    Risk Management: Based on the diagnosis and assessment biopsychosocial treatment model was presented to the patient and was given the opportunity to ask any

## 2024-11-19 NOTE — PLAN OF CARE
[facility]  Initial Interdisciplinary Treatment Plan Note      Original treatment plan Date & Time: 11/19/24 1000    Admission Type:  Admission Type: Involuntary    Reason for admission:   Reason for Admission: \"I relapsed on crack cocaine. the voices talked me into it. Now i can't go back to the group home.\"    Estimated Length of Stay:  5-7days  Estimated Discharge Date: To be determined by physician.    PATIENT STRENGTHS:  Patient Strengths:   Patient Strengths and Limitations:Limitations: Demonstrates discomfort with /lack of social skills, Difficult relationships / poor social skills, Apathetic / unmotivated, Unrealistic self-view, Lacks leisure interests, Inappropriate/potentially harmful leisure interests, Tendency to isolate self, Multiple barriers to leisure interests, Perceives need for assistance with self-care  Addictive Behavior: Addictive Behavior  In the Past 3 Months, Have You Felt or Has Someone Told You That You Have a Problem With  : None  Medical Problems:  Past Medical History:   Diagnosis Date    Asthma     Bipolar 1 disorder (HCC)     Schizo affective schizophrenia (HCC)      Status EXAM:Mental Status and Behavioral Exam  Normal: No  Level of Assistance: Independent/Self  Facial Expression: Worried  Affect: Blunt  Level of Consciousness: Alert  Frequency of Checks: 4 times per hour, close  Mood:Normal: No  Mood: Anxious, Depressed  Motor Activity:Normal: Yes  Eye Contact: Good  Observed Behavior: Friendly, Cooperative  Sexual Misconduct History: Current - no  Preception: [place] to person, [place] to place, [place] to time, [place] to situation  Attention:Normal: No  Attention: Distractible  Thought Processes: Perseveration  Thought Content:Normal: No  Thought Content: Preoccupations  Depression Symptoms: Feelings of hopelessess, Feelings of helplessness, Impaired concentration  Anxiety Symptoms: Generalized  Caridad Symptoms: Poor judgment  Hallucinations:  (\"I dont want to talk  about it\")  Delusions: No  Delusions: Somatic  Memory:Normal: Yes  Insight and Judgment: No  Insight and Judgment: Poor judgment, Poor insight    EDUCATION:   Learner Progress Toward Treatment Goals: Will review group plans and strategies for care.    Method: Group therapy, Medication compliance, Individualized assessments and Care planning.    Outcome: Needs Reinforcement    PATIENT GOALS: To be discussed with patient within 72 hours    PLAN/TREATMENT RECOMMENDATIONS:     Continue group therapy , Medications compliance, Goal setting, Individualized assessments and Care planning, continue to monitor patient on unit.      SHORT-TERM GOALS:   Time frame for Short-Term Goals: 5-7 days    LONG-TERM GOALS:  Time frame for Long-Term Goals: 6 months  Members Present in Team Meeting: See Signature Sheet    Kenzie Dunn RN

## 2024-11-19 NOTE — PLAN OF CARE
Problem: Self Harm/Suicidality  Goal: Will have no self-injury during hospital stay  Description: INTERVENTIONS:  1.  Ensure constant observer at bedside with Q15M safety checks  2.  Maintain a safe environment  3.  Secure patient belongings  4.  Ensure family/visitors adhere to safety recommendations  5.  Ensure safety tray has been added to patient's diet order  6.  Every shift and PRN: Re-assess suicidal risk via Frequent Screener    Outcome: Progressing     Problem: Depression  Goal: Will be euthymic at discharge  Description: INTERVENTIONS:  1. Administer medication as ordered  2. Provide emotional support via 1:1 interaction with staff  3. Encourage involvement in milieu/groups/activities  4. Monitor for social isolation  Outcome: Progressing     Problem: Behavior  Goal: Pt/Family maintain appropriate behavior and adhere to behavioral management agreement, if implemented  Description: INTERVENTIONS:  1. Assess patient/family's coping skills and  non-compliant behavior (including use of illegal substances)  2. Notify security of behavior or suspected illegal substances which indicate the need for search of the family and/or belongings  3. Encourage verbalization of thoughts and concerns in a socially appropriate manner  4. Utilize positive, consistent limit setting strategies supporting safety of patient, staff and others  5. Encourage participation in the decision making process about the behavioral management agreement  6. If a visitor's behavior poses a threat to safety call refer to organization policy.  7. Initiate consult with , Psychosocial CNS, Spiritual Care as appropriate  Outcome: Progressing   Patient calm and cooperative at this time. Patient denies HI at this time. Patient endorses passive SI with no plan or intent. Patient also states she has periodic auditory and visual hallucinations. Patient states visual hallucinations are undefined. Patient verbalizes auditory hallucinations

## 2024-11-19 NOTE — GROUP NOTE
Group Therapy Note    Date: 11/19/2024    Group Start Time: 1400  Group End Time: 1500  Group Topic: Recovery    [facility] ACUTE BH 1    Alie Fung, CTRS    Date: 11/19/2024  Type of Group: Recreational    Wellness Binder Information  Module Name:  [employer] Therapy     Patient's Goal:  Pt will be able to be an active listener and watch comedy with others.     Notes:  Pleasant and appeared to be actively watching movie.     Status After Intervention:  Improved    Participation Level: Active Listener and Interactive    Participation Quality: Appropriate, Attentive, and Sharing      Speech:  normal      Thought Process/Content: Logical      Affective Functioning: Congruent      Mood: euthymic      Level of consciousness:  Alert, Oriented x4, and Attentive      Response to Learning: Able to verbalize/acknowledge new learning, Able to retain information, and Progressing to goal      Endings: None Reported    Modes of Intervention: Support, Socialization, Activity, and Media      Discipline Responsible: Psychoeducational Specialist      Signature:  Alie Fung, CTRS

## 2024-11-19 NOTE — CARE COORDINATION
Pt approached SW and stated she gets paid on December 3rd and will be able to pay for a motel at that time. Pt asked to stay in the hospital until December 3rd to ensure her medications are working. SW informed the pt we will work with her on a safe discharge plan and SW will update her with different discharge options as soon as the options are known.

## 2024-11-20 LAB
EKG ATRIAL RATE: 55 BPM
EKG P AXIS: 41 DEGREES
EKG P-R INTERVAL: 146 MS
EKG Q-T INTERVAL: 446 MS
EKG QRS DURATION: 102 MS
EKG QTC CALCULATION (BAZETT): 426 MS
EKG R AXIS: 33 DEGREES
EKG T AXIS: 30 DEGREES
EKG VENTRICULAR RATE: 55 BPM

## 2024-11-20 PROCEDURE — 99232 SBSQ HOSP IP/OBS MODERATE 35: CPT | Performed by: NURSE PRACTITIONER

## 2024-11-20 PROCEDURE — 1240000000 HC EMOTIONAL WELLNESS R&B

## 2024-11-20 PROCEDURE — 6370000000 HC RX 637 (ALT 250 FOR IP): Performed by: STUDENT IN AN ORGANIZED HEALTH CARE EDUCATION/TRAINING PROGRAM

## 2024-11-20 PROCEDURE — 6370000000 HC RX 637 (ALT 250 FOR IP): Performed by: PSYCHIATRY & NEUROLOGY

## 2024-11-20 PROCEDURE — 6370000000 HC RX 637 (ALT 250 FOR IP): Performed by: NURSE PRACTITIONER

## 2024-11-20 RX ADMIN — NICOTINE POLACRILEX 2 MG: 2 LOZENGE ORAL at 14:50

## 2024-11-20 RX ADMIN — HYDROXYZINE PAMOATE 50 MG: 50 CAPSULE ORAL at 19:43

## 2024-11-20 RX ADMIN — ACETAMINOPHEN 650 MG: 325 TABLET ORAL at 17:40

## 2024-11-20 RX ADMIN — METFORMIN HYDROCHLORIDE 1000 MG: 500 TABLET, FILM COATED ORAL at 17:41

## 2024-11-20 RX ADMIN — NICOTINE POLACRILEX 2 MG: 2 LOZENGE ORAL at 18:47

## 2024-11-20 RX ADMIN — ACETAMINOPHEN 650 MG: 325 TABLET ORAL at 12:17

## 2024-11-20 RX ADMIN — TRAZODONE HYDROCHLORIDE 50 MG: 50 TABLET ORAL at 21:14

## 2024-11-20 RX ADMIN — NICOTINE POLACRILEX 2 MG: 2 LOZENGE ORAL at 16:50

## 2024-11-20 RX ADMIN — OXCARBAZEPINE 300 MG: 300 TABLET, FILM COATED ORAL at 21:14

## 2024-11-20 RX ADMIN — NICOTINE POLACRILEX 2 MG: 2 LOZENGE ORAL at 08:09

## 2024-11-20 RX ADMIN — NITROFURANTOIN (MONOHYDRATE/MACROCRYSTALS) 100 MG: 75; 25 CAPSULE ORAL at 09:04

## 2024-11-20 RX ADMIN — NICOTINE POLACRILEX 2 MG: 2 LOZENGE ORAL at 12:52

## 2024-11-20 RX ADMIN — NICOTINE POLACRILEX 2 MG: 2 LOZENGE ORAL at 10:51

## 2024-11-20 RX ADMIN — OXCARBAZEPINE 300 MG: 300 TABLET, FILM COATED ORAL at 09:04

## 2024-11-20 RX ADMIN — NITROFURANTOIN (MONOHYDRATE/MACROCRYSTALS) 100 MG: 75; 25 CAPSULE ORAL at 21:14

## 2024-11-20 RX ADMIN — Medication 3 MG: at 21:14

## 2024-11-20 RX ADMIN — PALIPERIDONE 3 MG: 3 TABLET, EXTENDED RELEASE ORAL at 09:04

## 2024-11-20 RX ADMIN — HYDROXYZINE PAMOATE 50 MG: 50 CAPSULE ORAL at 12:17

## 2024-11-20 RX ADMIN — METFORMIN HYDROCHLORIDE 1000 MG: 500 TABLET, FILM COATED ORAL at 09:04

## 2024-11-20 RX ADMIN — NICOTINE POLACRILEX 2 MG: 2 LOZENGE ORAL at 21:15

## 2024-11-20 ASSESSMENT — PAIN SCALES - GENERAL
PAINLEVEL_OUTOF10: 6
PAINLEVEL_OUTOF10: 6

## 2024-11-20 ASSESSMENT — PAIN DESCRIPTION - LOCATION
LOCATION: GENERALIZED
LOCATION: GENERALIZED

## 2024-11-20 ASSESSMENT — PAIN DESCRIPTION - DESCRIPTORS
DESCRIPTORS: ACHING;DISCOMFORT;DULL
DESCRIPTORS: ACHING;DISCOMFORT;DULL

## 2024-11-20 ASSESSMENT — PAIN - FUNCTIONAL ASSESSMENT
PAIN_FUNCTIONAL_ASSESSMENT: ACTIVITIES ARE NOT PREVENTED
PAIN_FUNCTIONAL_ASSESSMENT: ACTIVITIES ARE NOT PREVENTED

## 2024-11-20 NOTE — GROUP NOTE
Group Therapy Note    Date: 11/20/2024    Group Start Time: 1400  Group End Time: 1440  Group Topic: Recovery    [facility] ACUTE BH 1    Alie Fung, CTRS        Date: 11/20/2024  Type of Group: Recovery    Wellness Binder Information  Module Name:  Peer Recovery     Patient's Goal:  Pt will be able to id local support and addiction services. Will be able to provide active listening and support.     Notes:  Pleasant and engaged in group.     Status After Intervention:  Improved    Participation Level: Active Listener and Interactive    Participation Quality: Appropriate, Attentive, and Sharing      Speech:  normal      Thought Process/Content: Logical      Affective Functioning: Congruent      Mood: euthymic      Level of consciousness:  Alert, Oriented x4, and Attentive      Response to Learning: Able to verbalize/acknowledge new learning, Able to retain information, and Progressing to goal      Endings: None Reported    Modes of Intervention: Education, Support, Socialization, and Problem-solving      Discipline Responsible: Psychoeducational Specialist      Signature:  Alie Fung CTRS

## 2024-11-20 NOTE — CARE COORDINATION
Provided assigned SW, contact information for [facility] in [place].    [facility]  [address] [place] [address]  Phone: [phone]    Alternative options:    [facility]  [address] [place] [address]  Phone: [phone]    [facility]   [address] [facility] [place] [address]  Phone: [phone]  Ask for [facility] program, which is for homeless/near homeless individuals with severe/persistent mental illness.  This may be exclusive to [facility] residents, however, patient is a [facility] resident and resided within 15 minutes of [place] in [facility]      Electronically signed by ROSALIE Gee, MICHELLEW on 11/20/2024 at 10:28 AM

## 2024-11-20 NOTE — PLAN OF CARE
[facility] [place]  Day 3 Interdisciplinary Treatment Plan NOTE    Review Date & Time: 11/20/2024  12:52 PM     Patient was in treatment team    Estimated Length of Stay Update:  3 TO 5 DAYS  Estimated Discharge Date Update:  tbd    EDUCATION:   Learner Progress Toward Treatment Goals: Reviewed results and recommendations of this team, Reviewed group plan and strategies, Reviewed signs, symptoms and risk of self harm and violent behavior, and Reviewed goals and plan of care    Method: Group    Outcome: Needs reinforcement    PATIENT GOALS:  \" BE POSITIVE\"    PLAN/TREATMENT RECOMMENDATIONS UPDATE:Continue with group therapies,increase socialization,continue planning for discharge goals,continue with medication compliance    GOALS UPDATE:   Time frame for Short-Term Goals:  BY DISCHARGE      Victorina Powers RN

## 2024-11-20 NOTE — CARE COORDINATION
Navigator reviewed additional options for discharge planning:    [facility]  [address] [place] [id number]  Phone: [phone]    Electronically signed by ROSALIE Gee, MICHELLEW on 11/20/2024 at 2:41 PM

## 2024-11-20 NOTE — PLAN OF CARE
Problem: Depression  Goal: Will be euthymic at discharge  Description: INTERVENTIONS:  1. Administer medication as ordered  2. Provide emotional support via 1:1 interaction with staff  3. Encourage involvement in milieu/groups/activities  4. Monitor for social isolation  Outcome: Not Progressing     Problem: Self Harm/Suicidality  Goal: Will have no self-injury during hospital stay  Description: INTERVENTIONS:  1.  Ensure constant observer at bedside with Q15M safety checks  2.  Maintain a safe environment  3.  Secure patient belongings  4.  Ensure family/visitors adhere to safety recommendations  5.  Ensure safety tray has been added to patient's diet order  6.  Every shift and PRN: Re-assess suicidal risk via Frequent Screener    Outcome: Not Progressing     Problem: Anxiety  Goal: Will report anxiety at manageable levels  Description: INTERVENTIONS:  1. Administer medication as ordered  2. Teach and rehearse alternative coping skills  3. Provide emotional support with 1:1 interaction with staff  Outcome: Progressing    Mostly low profile this morning. Up later for group. Flat and depressed.Still reporting suicidal thoughts with no plan or intent. Admits to be clarita voice of her daughter being tortured.

## 2024-11-20 NOTE — PROGRESS NOTES
BEHAVIORAL HEALTH FOLLOW-UP NOTE     11/20/2024     Patient was seen and examined in person, Chart reviewed   Patient's case discussed with staff/team    Chief Complaint: Suicidal statements and auditory hallucinations    Interim History:   I saw patient this morning with the treatment team .  Patient continues to report suicidal ideations she also notices auditory hallucinations telling her that her daughter is being tortured she states that her  called in.  We did explain to patient limitations that are going to a group home and also her limitations are staying in the [facility] she has been attending Groups no behavior disturbances noted medication compliant with medications are working well for her    Appetite: [x] Normal/Unchanged  [] Increased  [] Decreased      Sleep:       [x] Normal/Unchanged  [] Fair       [] Poor              Energy:    [x] Normal/Unchanged  [] Increased  [] Decreased        SI [] Present  [x] Absent    HI  []Present  [x] Absent     Aggression:  [] yes  [x] no    Patient is [x] able  [] unable to CONTRACT FOR SAFETY     PAST MEDICAL/PSYCHIATRIC HISTORY:   Past Medical History:   Diagnosis Date    Asthma     Bipolar 1 disorder (HCC)     Schizo affective schizophrenia (HCC)        FAMILY/SOCIAL HISTORY:  No family history on file.  Social History     Socioeconomic History    Marital status: Single     Spouse name: Not on file    Number of children: 1    Years of education: 12    Highest education level: Not on file   Occupational History    Not on file   Tobacco Use    Smoking status: Every Day     Current packs/day: 2.00     Average packs/day: 2.0 packs/day for 15.7 years (31.5 ttl pk-yrs)     Types: Cigarettes     Start date: 2/26/2009    Smokeless tobacco: Never   Vaping Use    Vaping status: Never Used   Substance and Sexual Activity    Alcohol use: Not Currently    Drug use: Yes     Types: Cocaine     Comment: 1 ball 2 wks ago    Sexual activity: Yes     Partners:  Janae CARVALHO MD, 650 mg at 11/19/24 2020    hydrOXYzine pamoate (VISTARIL) capsule 50 mg, 50 mg, Oral, TID PRN, Jana Downeyi MACEY, APRN - CNP, 50 mg at 11/19/24 1703    aluminum & magnesium hydroxide-simethicone (MAALOX) 200-200-20 MG/5ML suspension 30 mL, 30 mL, Oral, Q6H PRN, Dellick Rhonda B, APRN - CNP    magnesium hydroxide (MILK OF MAGNESIA) 400 MG/5ML suspension 30 mL, 30 mL, Oral, Daily PRN, Dellick, Rhonda B, APRN - CNP    melatonin tablet 3 mg, 3 mg, Oral, Nightly PRN, Dellick Rhonda B, APRN - CNP, 3 mg at 11/19/24 2020    nicotine polacrilex (COMMIT) lozenge 2 mg, 2 mg, Oral, Q2H PRN, Jessica Carter MD, 2 mg at 11/20/24 0809      Examination:  BP (!) 153/90   Pulse 83   Temp 97.5 °F (36.4 °C) (Oral)   Resp 16   Ht 1.626 m (5' 4\")   Wt (!) 145.2 kg (320 lb)   SpO2 98%   BMI 54.93 kg/m²   Gait - steady  Medication side effects(SE):     Mental Status Examination:    Level of consciousness:  within normal limits   Appearance:  fair grooming and fair hygiene  Behavior/Motor:  no abnormalities noted  Attitude toward examiner:  cooperative  Speech:  spontaneous, normal rate and normal volume   Mood: \" I am okay\"  Affect: Euthymic   thought processes: Linear without flights of ideas loose associations  Thought content: Endorses auditory hallucinations endorses suicide nations without plan denies homicidal ideations intent or plan   language: able to name objects and repeate phrases  Remote Memory: intact  Recent Memory: intact  Cognition:  oriented to person, place, and time   Fund of Knowledge: Vocabulary intact, pt is aware of current events and past history  Attetion and Concentration intact  Insight fair   Judgement fair     ASSESSMENT: Patient symptoms are:  [] Well controlled  [] Improving  [] Worsening  [x] No change      Diagnosis:  Principal Problem:    Schizoaffective disorder, bipolar type (HCC)  Active Problems:    Cluster B personality disorder (HCC)  Resolved Problems:    Schizo affective

## 2024-11-20 NOTE — CARE COORDINATION
AZUL received a call from Anthony with [facility] [phone]. Anthony stated he will email SW the referral form with the criteria list. He stated the pt will need a psych eval, physical exam, and a letter stating they are the appropriate level of care for the pt all signed by a doctor. After they receive the information and review it they will want to schedule a time to meet with the pt and the treatment team to discuss her plan of care. Anthony stated the referral process takes more than a few days to complete. He stated pt's outpatient  can also assist with the referral process.    AZUL contacted pt's  Farhana Harmon ([phone]) and left a voicemail requesting a call back.     AZUL contacted [facility] [phone] and confirmed pt's appointment on 12/5 for her next YADAV.

## 2024-11-20 NOTE — GROUP NOTE
Group Therapy Note    Date: 11/20/2024    Group Start Time: 1400  Group End Time: 1500  Group Topic: Recovery    [facility] ACUTE [facility] 1    Alie Fung, CTRS    Date: 11/20/2024  Type of Group: Recovery    Wellness Binder Information  Module Name:  Peer Recovery     Patient's Goal:  Pt will be able to id local support and addiction services. Will be able to provide active listening and support.     Notes:  Pleasant and engaged in group.     Status After Intervention:  Improved    Participation Level: Active Listener and Interactive    Participation Quality: Appropriate, Attentive, and Sharing      Speech:  normal      Thought Process/Content: Logical      Affective Functioning: Congruent      Mood: euthymic      Level of consciousness:  Alert, Oriented x4, and Attentive      Response to Learning: Able to verbalize/acknowledge new learning, Able to retain information, and Progressing to goal      Endings: None Reported    Modes of Intervention: Education, Support, Socialization, and Problem-solving      Discipline Responsible: Psychoeducational Specialist      Signature:  Alie Fung, CTRS

## 2024-11-20 NOTE — CARE COORDINATION
Pt approached AZUL and stated while she was at [facility] they were looking into [facility] for her. Pt stated this is long term structured housing.     AZUL contacted [facility] [phone] and left a voicemail for Anthony.

## 2024-11-20 NOTE — CARE COORDINATION
AZUL received a call from pt's aunt Jeannette [phone] (KIESHA signed) requesting an update on the pt's discharge plan. Jeannette reports the pt has burned all of her bridges in [facility] which is why they brought her to the [facility]. Jeannette reports the pt was kicked out of the mission after two days. Jeannette stated they \"don't know what to do with her.\" They tried putting her in a hotel but she got kicked out of the hotel after 2 hours. They had to pick the pt up in the middle of night. Jeannette reports they stayed up with the pt all night monitoring her because she wasn't sleeping but they cannot do this long term. Jeannette reports the pt urinates outside instead of using the bathroom and has been experiencing AVH. Jeannette reports the pt has a lot of severe problems where she cannot be alone but any place that can provide the support she needs kicks her out.    Jeannette stated they are willing to help transport the pt to any facility that is willing to accept her as they have all of her belongings. Jeannette stated they will also help get the pt to and from her appointments but she cannot stay with them because they cannot handle her. Jeannette reports the pt has an appointment with her outpatient doctor in [place] on 12/5 for her next YADAV. Jeannette had questions for pt's assigned RN about the medications and no further questions for AZUL so the call was transferred to the nurses station.

## 2024-11-20 NOTE — PLAN OF CARE
Patient can be obnoxious. Patient has poor boundaries. Patient is endorsing SI with no plan or intent in which she reports will let staff know if that changes. Pt is endorsing auditory hallucinations of \"her daughter being tortured.\" Patient is medication compliant. Patient is attending select group activities. Patient is eating meals. Patient denies any homicidal ideations or visual hallucinations.       Problem: Depression  Goal: Will be euthymic at discharge  Description: INTERVENTIONS:  1. Administer medication as ordered  2. Provide emotional support via 1:1 interaction with staff  3. Encourage involvement in milieu/groups/activities  4. Monitor for social isolation  11/19/2024 2318 by Price Rdz RN  Outcome: Progressing     Problem: Behavior  Goal: Pt/Family maintain appropriate behavior and adhere to behavioral management agreement, if implemented  Description: INTERVENTIONS:  1. Assess patient/family's coping skills and  non-compliant behavior (including use of illegal substances)  2. Notify security of behavior or suspected illegal substances which indicate the need for search of the family and/or belongings  3. Encourage verbalization of thoughts and concerns in a socially appropriate manner  4. Utilize positive, consistent limit setting strategies supporting safety of patient, staff and others  5. Encourage participation in the decision making process about the behavioral management agreement  6. If a visitor's behavior poses a threat to safety call refer to organization policy.  7. Initiate consult with , Psychosocial CNS, Spiritual Care as appropriate  11/19/2024 2318 by Price Rdz RN  Outcome: Progressing     Problem: Anxiety  Goal: Will report anxiety at manageable levels  Description: INTERVENTIONS:  1. Administer medication as ordered  2. Teach and rehearse alternative coping skills  3. Provide emotional support with 1:1 interaction with staff  11/19/2024 2318 by Price Rdz

## 2024-11-20 NOTE — CARE COORDINATION
Pt was seen during treatment team. Pt reports feeling alright today and she is started to feel a little bit better. Pt reports feeling \"a little\" suicidal, denied HI/VH, and reported \"a little\" AH of voices telling her her daughter is being tortured. Pt is aware SW is working on locating discharge options for her at this time and understands the limitations of getting her into a group home. Pt cooperative, with good eye contact, clear speech, fair insight/judgement.     SW contacted [facility]  [phone] however SW received a message stating this number is not in service.     SW contacted [facility] [phone] and left a voicemail for Tiffani with the [facility] program.

## 2024-11-21 PROCEDURE — 1240000000 HC EMOTIONAL WELLNESS R&B

## 2024-11-21 PROCEDURE — 99232 SBSQ HOSP IP/OBS MODERATE 35: CPT | Performed by: NURSE PRACTITIONER

## 2024-11-21 PROCEDURE — 6370000000 HC RX 637 (ALT 250 FOR IP): Performed by: PSYCHIATRY & NEUROLOGY

## 2024-11-21 PROCEDURE — 6370000000 HC RX 637 (ALT 250 FOR IP): Performed by: NURSE PRACTITIONER

## 2024-11-21 PROCEDURE — 6370000000 HC RX 637 (ALT 250 FOR IP): Performed by: STUDENT IN AN ORGANIZED HEALTH CARE EDUCATION/TRAINING PROGRAM

## 2024-11-21 RX ADMIN — NITROFURANTOIN (MONOHYDRATE/MACROCRYSTALS) 100 MG: 75; 25 CAPSULE ORAL at 08:47

## 2024-11-21 RX ADMIN — NICOTINE POLACRILEX 2 MG: 2 LOZENGE ORAL at 17:13

## 2024-11-21 RX ADMIN — NITROFURANTOIN (MONOHYDRATE/MACROCRYSTALS) 100 MG: 75; 25 CAPSULE ORAL at 20:23

## 2024-11-21 RX ADMIN — OXCARBAZEPINE 300 MG: 300 TABLET, FILM COATED ORAL at 08:47

## 2024-11-21 RX ADMIN — HYDROXYZINE PAMOATE 50 MG: 50 CAPSULE ORAL at 20:22

## 2024-11-21 RX ADMIN — NICOTINE POLACRILEX 2 MG: 2 LOZENGE ORAL at 15:09

## 2024-11-21 RX ADMIN — NICOTINE POLACRILEX 2 MG: 2 LOZENGE ORAL at 10:43

## 2024-11-21 RX ADMIN — PALIPERIDONE 3 MG: 3 TABLET, EXTENDED RELEASE ORAL at 08:47

## 2024-11-21 RX ADMIN — HYDROXYZINE PAMOATE 50 MG: 50 CAPSULE ORAL at 11:53

## 2024-11-21 RX ADMIN — NICOTINE POLACRILEX 2 MG: 2 LOZENGE ORAL at 08:47

## 2024-11-21 RX ADMIN — OXCARBAZEPINE 300 MG: 300 TABLET, FILM COATED ORAL at 20:22

## 2024-11-21 RX ADMIN — NICOTINE POLACRILEX 2 MG: 2 LOZENGE ORAL at 12:40

## 2024-11-21 RX ADMIN — ACETAMINOPHEN 650 MG: 325 TABLET ORAL at 11:53

## 2024-11-21 RX ADMIN — NICOTINE POLACRILEX 2 MG: 2 LOZENGE ORAL at 19:09

## 2024-11-21 RX ADMIN — METFORMIN HYDROCHLORIDE 1000 MG: 500 TABLET, FILM COATED ORAL at 17:13

## 2024-11-21 RX ADMIN — METFORMIN HYDROCHLORIDE 1000 MG: 500 TABLET, FILM COATED ORAL at 08:47

## 2024-11-21 RX ADMIN — ACETAMINOPHEN 650 MG: 325 TABLET ORAL at 15:53

## 2024-11-21 RX ADMIN — Medication 3 MG: at 20:22

## 2024-11-21 RX ADMIN — TRAZODONE HYDROCHLORIDE 50 MG: 50 TABLET ORAL at 20:22

## 2024-11-21 ASSESSMENT — PAIN DESCRIPTION - DESCRIPTORS: DESCRIPTORS: ACHING;DISCOMFORT;DULL

## 2024-11-21 ASSESSMENT — PAIN SCALES - GENERAL
PAINLEVEL_OUTOF10: 0
PAINLEVEL_OUTOF10: 0
PAINLEVEL_OUTOF10: 6

## 2024-11-21 ASSESSMENT — PAIN - FUNCTIONAL ASSESSMENT: PAIN_FUNCTIONAL_ASSESSMENT: ACTIVITIES ARE NOT PREVENTED

## 2024-11-21 ASSESSMENT — PAIN SCALES - WONG BAKER: WONGBAKER_NUMERICALRESPONSE: NO HURT

## 2024-11-21 ASSESSMENT — PAIN DESCRIPTION - LOCATION: LOCATION: GENERALIZED

## 2024-11-21 NOTE — CARE COORDINATION
AZUL contacted pt's father Francois [phone] (KIESHA signed). Francois asked SW to call back tomorrow morning because he works until 10:00 pm. Francois reports the pt makes a lot of things up and staying with him is one of them. Francois stated staying with him is not an option at this time because his girlfriend does not want her in the home.    AZUL spoke with the pt. Pt is open to going to a homeless shelter in [place] or [place] at this time. Pt does not have a photo ID.    AZUL contacted pt's  Farhana Harmon ([phone]) and left a voicemail requesting a call back.     AZUL contacted [place] [facility] [phone] and left a voicemail requesting a call back.     AZUL contacted [facility] [phone] and was advised they are open 24 hours for intakes so the pt can walk in at any time. The pt does not need a photo ID and if she stays long enough they will be able to assist her getting an ID.

## 2024-11-21 NOTE — CARE COORDINATION
SW Supervisor called [facility] ([phone]) in [facility] to obtain information regarding their shelter. No answer, left voicemail.     ROSALIE Danielle, FAIZAS  Behavioral Health      IV discontinued, cath removed intact

## 2024-11-21 NOTE — BH NOTE
Pt at nurses station.  Pt has good eye contact.    Appears well groomed/neat, room Is clean.    Patient is alert and oriented x 4.     Denies SI/HI, VH, or thoughts of self harm when asked.  Pt reports that earlier she hear whispering voices speaking about \"torture\".  Rates anxiety at 7/10 and depression at 0/10.    Denies pain. No noted signs or symptoms of distress.  Pt is compliant with PM medication.  According to previous notes pt attended one daytime groups.  Pt states she was previously on an antidepressant and wishes to be put back on one.     Purposeful Q15min rounding continues.

## 2024-11-21 NOTE — CARE COORDINATION
Pt approached SW and stated she spoke with her father and she will be staying with him at time of discharge. Pt reports feeling better after having her Invega increased. Pt denied SI/HI/AVH. Pt signed the KIESHA for her father Francois. Pt cooperative, with good eye contact, clear speech, fair insight/judgement.     SW contacted pt's father Francois [phone] (KIESHA signed). No answer, unable to leave a voicemail.

## 2024-11-21 NOTE — PROGRESS NOTES
BEHAVIORAL HEALTH FOLLOW-UP NOTE     11/21/2024     Patient was seen and examined in person, Chart reviewed   Patient's case discussed with staff/team    Chief Complaint: Suicidal statements and auditory hallucinations    Interim History:   Patient seen up on the unit.  States he talked to her dad and her dad is going to let her stay at his house until she is able to get into a group home.  She states that she is feeling better with the addition of the oral Invega.  She believes her medications are working well for her.  She denies suicidal or homicidal ideations intent or plan denies any auditory or visual hallucinations.  She is hopeful to go stay with her dad on discharge.        Appetite: [x] Normal/Unchanged  [] Increased  [] Decreased      Sleep:       [x] Normal/Unchanged  [] Fair       [] Poor              Energy:    [x] Normal/Unchanged  [] Increased  [] Decreased        SI [] Present  [x] Absent    HI  []Present  [x] Absent     Aggression:  [] yes  [x] no    Patient is [x] able  [] unable to CONTRACT FOR SAFETY     PAST MEDICAL/PSYCHIATRIC HISTORY:   Past Medical History:   Diagnosis Date    Asthma     Bipolar 1 disorder (HCC)     Schizo affective schizophrenia (HCC)        FAMILY/SOCIAL HISTORY:  No family history on file.  Social History     Socioeconomic History    Marital status: Single     Spouse name: Not on file    Number of children: 1    Years of education: 12    Highest education level: Not on file   Occupational History    Not on file   Tobacco Use    Smoking status: Every Day     Current packs/day: 2.00     Average packs/day: 2.0 packs/day for 15.7 years (31.5 ttl pk-yrs)     Types: Cigarettes     Start date: 2/26/2009    Smokeless tobacco: Never   Vaping Use    Vaping status: Never Used   Substance and Sexual Activity    Alcohol use: Not Currently    Drug use: Yes     Types: Cocaine     Comment: 1 ball 2 wks ago    Sexual activity: Yes     Partners: Female   Other Topics Concern    Not on file  hydrOXYzine pamoate (VISTARIL) capsule 50 mg, 50 mg, Oral, TID PRN, Dellick, Rhonda B, APRN - CNP, 50 mg at 11/21/24 1153    aluminum & magnesium hydroxide-simethicone (MAALOX) 200-200-20 MG/5ML suspension 30 mL, 30 mL, Oral, Q6H PRN, Dellick, Rhonda B, APRN - CNP    magnesium hydroxide (MILK OF MAGNESIA) 400 MG/5ML suspension 30 mL, 30 mL, Oral, Daily PRN, Dellick, Rhonda B, APRN - CNP    melatonin tablet 3 mg, 3 mg, Oral, Nightly PRN, Dellick, Rhonda B, APRN - CNP, 3 mg at 11/20/24 2114    nicotine polacrilex (COMMIT) lozenge 2 mg, 2 mg, Oral, Q2H PRN, Jessica Carter MD, 2 mg at 11/21/24 1043      Examination:  /74   Pulse 70   Temp 98.1 °F (36.7 °C) (Oral)   Resp 16   Ht 1.626 m (5' 4\")   Wt (!) 145.2 kg (320 lb)   SpO2 98%   BMI 54.93 kg/m²   Gait - steady  Medication side effects(SE):     Mental Status Examination:    Level of consciousness:  within normal limits   Appearance:  fair grooming and fair hygiene  Behavior/Motor:  no abnormalities noted  Attitude toward examiner:  cooperative  Speech:  spontaneous, normal rate and normal volume   Mood: \" My mood is good.\"  Affect: Bright appropriate and pleasant  Thought processes: Linear without flights of ideas loose associations  Thought content: Devoid of any auditory visual hallucinations delusions or other perceptual normalities.  Denies SI/HI intent or plan   Language: able to name objects and repeate phrases  Remote Memory: intact  Recent Memory: intact  Cognition:  oriented to person, place, and time   Fund of Knowledge: Vocabulary intact, pt is aware of current events and past history  Attetion and Concentration intact  Insight fair   Judgement fair     ASSESSMENT: Patient symptoms are:  [] Well controlled  [x] Improving  [] Worsening  [] No change      Diagnosis:  Principal Problem:    Schizoaffective disorder, bipolar type (HCC)  Active Problems:    Cluster B personality disorder (HCC)  Resolved Problems:    Schizo affective schizophrenia (HCC)

## 2024-11-21 NOTE — PLAN OF CARE
Problem: Self Harm/Suicidality  Goal: Will have no self-injury during hospital stay  Description: INTERVENTIONS:  1.  Ensure constant observer at bedside with Q15M safety checks  2.  Maintain a safe environment  3.  Secure patient belongings  4.  Ensure family/visitors adhere to safety recommendations  5.  Ensure safety tray has been added to patient's diet order  6.  Every shift and PRN: Re-assess suicidal risk via Frequent Screener    11/20/2024 2158 by Kash Newsome RN  Outcome: Progressing  11/20/2024 1624 by Victorina Powers RN  Outcome: Not Progressing     Problem: Depression  Goal: Will be euthymic at discharge  Description: INTERVENTIONS:  1. Administer medication as ordered  2. Provide emotional support via 1:1 interaction with staff  3. Encourage involvement in milieu/groups/activities  4. Monitor for social isolation  11/20/2024 2158 by Kash Newsome RN  Outcome: Progressing  11/20/2024 1624 by Victorina Powers RN  Outcome: Not Progressing     Problem: Behavior  Goal: Pt/Family maintain appropriate behavior and adhere to behavioral management agreement, if implemented  Description: INTERVENTIONS:  1. Assess patient/family's coping skills and  non-compliant behavior (including use of illegal substances)  2. Notify security of behavior or suspected illegal substances which indicate the need for search of the family and/or belongings  3. Encourage verbalization of thoughts and concerns in a socially appropriate manner  4. Utilize positive, consistent limit setting strategies supporting safety of patient, staff and others  5. Encourage participation in the decision making process about the behavioral management agreement  6. If a visitor's behavior poses a threat to safety call refer to organization policy.  7. Initiate consult with , Psychosocial CNS, Spiritual Care as appropriate  Outcome: Progressing     Problem: Risk for Elopement  Goal: Patient will not exit the unit/facility without

## 2024-11-21 NOTE — PLAN OF CARE
Patient is alert and oriented x 4.  Denies pain.  No noted signs or symptoms of distress.   Denies SI/HI, A/V/H, or thoughts of self harm when asked.    Rates Anxiety at 0/10 and Depression at 0/10.    Refused on unit groups today, reclusive to self and room.     Medication compliant.     Pleasant, polite, and cooperative.    Affect is blunt.  Fair eye contact.  Appropriate with peers and staff when out on unit.  Able to make her needs known.  Appears disheveled, malodorous, room Is clean.    Described her mood as \"fine\".  Up for all meals.    Will continue to monitor for safety q 15 minute safety rounds and environmental assessments.

## 2024-11-22 PROCEDURE — 6370000000 HC RX 637 (ALT 250 FOR IP): Performed by: NURSE PRACTITIONER

## 2024-11-22 PROCEDURE — 6370000000 HC RX 637 (ALT 250 FOR IP): Performed by: PSYCHIATRY & NEUROLOGY

## 2024-11-22 PROCEDURE — 1240000000 HC EMOTIONAL WELLNESS R&B

## 2024-11-22 PROCEDURE — 6370000000 HC RX 637 (ALT 250 FOR IP): Performed by: STUDENT IN AN ORGANIZED HEALTH CARE EDUCATION/TRAINING PROGRAM

## 2024-11-22 PROCEDURE — 99232 SBSQ HOSP IP/OBS MODERATE 35: CPT | Performed by: NURSE PRACTITIONER

## 2024-11-22 RX ORDER — PALIPERIDONE 3 MG/1
3 TABLET, EXTENDED RELEASE ORAL DAILY
Qty: 30 TABLET | Refills: 0 | Status: SHIPPED | OUTPATIENT
Start: 2024-11-22 | End: 2024-12-22

## 2024-11-22 RX ORDER — OXCARBAZEPINE 300 MG/1
300 TABLET, FILM COATED ORAL 2 TIMES DAILY
Qty: 60 TABLET | Refills: 0 | Status: SHIPPED | OUTPATIENT
Start: 2024-11-22 | End: 2024-12-22

## 2024-11-22 RX ORDER — NITROFURANTOIN 25; 75 MG/1; MG/1
100 CAPSULE ORAL 2 TIMES DAILY
Qty: 8 CAPSULE | Refills: 0 | Status: SHIPPED | OUTPATIENT
Start: 2024-11-22 | End: 2024-11-26

## 2024-11-22 RX ADMIN — METFORMIN HYDROCHLORIDE 1000 MG: 500 TABLET, FILM COATED ORAL at 08:50

## 2024-11-22 RX ADMIN — OXCARBAZEPINE 300 MG: 300 TABLET, FILM COATED ORAL at 08:50

## 2024-11-22 RX ADMIN — METFORMIN HYDROCHLORIDE 1000 MG: 500 TABLET, FILM COATED ORAL at 16:41

## 2024-11-22 RX ADMIN — TRAZODONE HYDROCHLORIDE 50 MG: 50 TABLET ORAL at 20:41

## 2024-11-22 RX ADMIN — HYDROXYZINE PAMOATE 50 MG: 50 CAPSULE ORAL at 20:41

## 2024-11-22 RX ADMIN — Medication 3 MG: at 21:19

## 2024-11-22 RX ADMIN — NITROFURANTOIN (MONOHYDRATE/MACROCRYSTALS) 100 MG: 75; 25 CAPSULE ORAL at 08:50

## 2024-11-22 RX ADMIN — NICOTINE POLACRILEX 2 MG: 2 LOZENGE ORAL at 20:41

## 2024-11-22 RX ADMIN — NITROFURANTOIN (MONOHYDRATE/MACROCRYSTALS) 100 MG: 75; 25 CAPSULE ORAL at 20:40

## 2024-11-22 RX ADMIN — PALIPERIDONE 3 MG: 3 TABLET, EXTENDED RELEASE ORAL at 08:50

## 2024-11-22 RX ADMIN — ACETAMINOPHEN 650 MG: 325 TABLET ORAL at 12:31

## 2024-11-22 RX ADMIN — NICOTINE POLACRILEX 2 MG: 2 LOZENGE ORAL at 17:57

## 2024-11-22 RX ADMIN — OXCARBAZEPINE 300 MG: 300 TABLET, FILM COATED ORAL at 20:40

## 2024-11-22 RX ADMIN — NICOTINE POLACRILEX 2 MG: 2 LOZENGE ORAL at 08:50

## 2024-11-22 RX ADMIN — NICOTINE POLACRILEX 2 MG: 2 LOZENGE ORAL at 11:40

## 2024-11-22 RX ADMIN — NICOTINE POLACRILEX 2 MG: 2 LOZENGE ORAL at 13:44

## 2024-11-22 RX ADMIN — HYDROXYZINE PAMOATE 50 MG: 50 CAPSULE ORAL at 12:31

## 2024-11-22 RX ADMIN — NICOTINE POLACRILEX 2 MG: 2 LOZENGE ORAL at 15:36

## 2024-11-22 ASSESSMENT — PAIN DESCRIPTION - ORIENTATION: ORIENTATION: INNER

## 2024-11-22 ASSESSMENT — PAIN DESCRIPTION - DESCRIPTORS: DESCRIPTORS: ACHING;DISCOMFORT;SORE

## 2024-11-22 ASSESSMENT — PAIN - FUNCTIONAL ASSESSMENT: PAIN_FUNCTIONAL_ASSESSMENT: ACTIVITIES ARE NOT PREVENTED

## 2024-11-22 ASSESSMENT — PAIN DESCRIPTION - LOCATION: LOCATION: GENERALIZED

## 2024-11-22 ASSESSMENT — PAIN SCALES - GENERAL
PAINLEVEL_OUTOF10: 0
PAINLEVEL_OUTOF10: 6

## 2024-11-22 NOTE — PROGRESS NOTES
[facility]  Discharge Note    Pt discharged with followings belongings:   Dental Appliances: None  Vision - Corrective Lenses: None  Hearing Aid: None  Jewelry: None  Body Piercings Removed: N/A  Clothing: Footwear, Pants, Shirt, Socks, Undergarments  Other Valuables: Other (Comment)   Valuables sent home with pt or returned to patient. Patient educated on aftercare instructions: yes  Information faxed to n/a by n/a  at 9:22 AM .Patient verbalize understanding of AVS:  yes .    Status EXAM upon discharge:  Mental Status and Behavioral Exam  Normal: No  Level of Assistance: Independent/Self  Facial Expression: Flat  Affect: Blunt  Level of Consciousness: Alert  Frequency of Checks: 4 times per hour, close  Mood:Normal: No  Mood: Helpless  Motor Activity:Normal: No  Motor Activity: Decreased  Eye Contact: Fair  Observed Behavior: Cooperative  Sexual Misconduct History: Current - no  Preception: Paul to person, Paul to time, Paul to place, Paul to situation  Attention:Normal: No  Attention: Distractible  Thought Processes: Unremarkable  Thought Content:Normal: No  Thought Content: Other (comment) (impoverished)  Depression Symptoms: Feelings of hopelessess, Feelings of helplessness  Anxiety Symptoms: Generalized  Caridad Symptoms: No problems reported or observed.  Hallucinations: None  Delusions: No  Delusions: Somatic  Memory:Normal: Yes  Memory: Other (comment)  Insight and Judgment: No  Insight and Judgment: Poor judgment, Poor insight    Tobacco Screening:  Practical Counseling, on admission, kaylah X, if applicable and completed (first 3 are required if patient doesn't refuse):            ( x) Recognizing danger situations (included triggers and roadblocks)                    ( x) Coping skills (new ways to manage stress,relaxation techniques, changing routine, distraction)                                                           ( x) Basic information about quitting (benefits of quitting,

## 2024-11-22 NOTE — PROGRESS NOTES
Pt attended afternoon smoking cessation group. Pt appeared to be an active listener. Pt is able to share appropriately when prompted and asked facilitator relevant questions.  Pt was participant 1 of 12.    Electronically signed by Damari Nation on 11/22/2024 at 3:44 PM

## 2024-11-22 NOTE — TRANSITION OF CARE
Behavioral Health Transition Record    Patient Name: Jayne Ybarra  YOB: 1995   Medical Record Number: [id number]  Date of Admission: 11/15/2024 11:48 AM   Date of Discharge:  11/23/24    Attending Provider: Janae Restrepo MD   Discharging Provider: Dr. Restrepo  To contact this individual call [phone] and ask the  to page.  If unavailable, ask to be transferred to Behavioral Health Provider on call.  A Behavioral Health Provider will be available on call 24/7 and during holidays.    Primary Care Provider: No primary care provider on file.    Allergies   Allergen Reactions    Bactrim [Sulfamethoxazole-Trimethoprim]        Reason for Admission: Patient name: Jayne Ybarra  Patient's past mental health and addiction history: Schizoaffective disorder, bipolar type and a history of polysubstance abuse  Patient's presentation to the ED and why the patient needs admission: Plan to commit suicide  Legal status:  []  Voluntary  [x]  Involuntary  []  Probate  Triggering/precipitating events: getting removed from the group home she was living in and relapsed on cocaine  Duration of triggering/precipitating events:  One week    Admission Diagnosis: Suicidal ideation [R45.851]  Schizo affective schizophrenia (HCC) [F25.9]    * No surgery found *    Results for orders placed or performed during the hospital encounter of 11/15/24   CBC with Auto Differential   Result Value Ref Range    WBC 7.4 4.5 - 11.5 k/uL    RBC 4.95 3.50 - 5.50 m/uL    Hemoglobin 14.1 11.5 - 15.5 g/dL    Hematocrit 43.3 34.0 - 48.0 %    MCV 87.5 80.0 - 99.9 fL    MCH 28.5 26.0 - 35.0 pg    MCHC 32.6 32.0 - 34.5 g/dL    RDW 13.8 11.5 - 15.0 %    Platelets 196 130 - 450 k/uL    MPV 12.2 (H) 7.0 - 12.0 fL    Neutrophils % 43 43.0 - 80.0 %    Lymphocytes % 43 (H) 20.0 - 42.0 %    Monocytes % 7 2.0 - 12.0 %    Eosinophils % 6 0 - 6 %    Basophils % 1 0.0 - 2.0 %    Immature Granulocytes % 0 0.0 - 5.0 %    Neutrophils Absolute  4.0 - 5.6 % Final       Discharge Diagnosis: Schizoaffective disorder, bipolar type.    Discharge Plan/Destination: Shelter in [place]  [facility]    Discharge Medication List and Instructions:      Medication List        START taking these medications      OXcarbazepine 300 MG tablet  Commonly known as: TRILEPTAL  Take 1 tablet by mouth 2 times daily     paliperidone 3 MG extended release tablet  Commonly known as: INVEGA  Take 1 tablet by mouth daily            CHANGE how you take these medications      nitrofurantoin (macrocrystal-monohydrate) 100 MG capsule  Commonly known as: MACROBID  Take 1 capsule by mouth 2 times daily for 8 doses  What changed: additional instructions            CONTINUE taking these medications      Invega Sustenna 234 MG/1.5ML Neida IM injection  Generic drug: paliperidone palmitate ER     metFORMIN 1000 MG tablet  Commonly known as: GLUCOPHAGE     traZODone 50 MG tablet  Commonly known as: DESYREL            STOP taking these medications      gabapentin 400 MG capsule  Commonly known as: NEURONTIN     sertraline 100 MG tablet  Commonly known as: ZOLOFT               Where to Get Your Medications        These medications were sent to [facility] [place] [address] [address] - P [phone] - F [phone]  [facility] [address]      Phone: [phone]   nitrofurantoin (macrocrystal-monohydrate) 100 MG capsule  OXcarbazepine 300 MG tablet  paliperidone 3 MG extended release tablet         Unresulted Labs (24h ago, onward)      None            To obtain results of studies pending at discharge, please contact [phone]    Follow-up Information       Follow up With Specialties Details Why Contact [facility]  Go on 12/5/2024 8:45 am in office mental health medication management appointment. You will receive your next Long Acting Injection at this appointment. [address] [place] [address]  Phone: [phone]

## 2024-11-22 NOTE — PROGRESS NOTES
Pt notified of $13.50 copay for home medications. Pt asked \"What if I don't pay?\" Pt given number for outpatient pharmacy and encouraged to call family to cover cost. Pt stated \"I'll try.\"

## 2024-11-22 NOTE — CARE COORDINATION
AZUL contacted [facility] [place] [phone] and was advised they always have space for homeless females, they just cannot guarantee a bed will be available. If no beds are available, they have mats available in an overflow room until a bed becomes available.     AZUL contacted pt's aunt Jeannette [phone] (KIESHA signed). No answer, a voicemail was left.     AZUL contacted pt's sister Tami [phone] (KIESHA signed). No answer, a voicemail was left.

## 2024-11-22 NOTE — PLAN OF CARE
Problem: Self Harm/Suicidality  Goal: Will have no self-injury during hospital stay  Description: INTERVENTIONS:  1.  Ensure constant observer at bedside with Q15M safety checks  2.  Maintain a safe environment  3.  Secure patient belongings  4.  Ensure family/visitors adhere to safety recommendations  5.  Ensure safety tray has been added to patient's diet order  6.  Every shift and PRN: Re-assess suicidal risk via Frequent Screener    11/21/2024 2025 by Gwyn Astudillo RN  Outcome: Progressing  11/21/2024 1419 by Shruti Brand RN  Outcome: Progressing     Problem: Depression  Goal: Will be euthymic at discharge  Description: INTERVENTIONS:  1. Administer medication as ordered  2. Provide emotional support via 1:1 interaction with staff  3. Encourage involvement in milieu/groups/activities  4. Monitor for social isolation  11/21/2024 2025 by Gwyn Astudillo RN  Outcome: Progressing  11/21/2024 1419 by Shruti Brand RN  Outcome: Progressing     Problem: Behavior  Goal: Pt/Family maintain appropriate behavior and adhere to behavioral management agreement, if implemented  Description: INTERVENTIONS:  1. Assess patient/family's coping skills and  non-compliant behavior (including use of illegal substances)  2. Notify security of behavior or suspected illegal substances which indicate the need for search of the family and/or belongings  3. Encourage verbalization of thoughts and concerns in a socially appropriate manner  4. Utilize positive, consistent limit setting strategies supporting safety of patient, staff and others  5. Encourage participation in the decision making process about the behavioral management agreement  6. If a visitor's behavior poses a threat to safety call refer to organization policy.  7. Initiate consult with , Psychosocial CNS, Spiritual Care as appropriate  11/21/2024 2025 by Gwyn Astudillo RN  Outcome: Progressing  11/21/2024 1419 by Shruti Brand RN  Outcome:

## 2024-11-22 NOTE — CARE COORDINATION
AZUL received a call from pt's aunt Jeannette [phone] (KIESHA signed). Jeannette stated her  will pick the pt up tomorrow 11/23 around 9:00 am. She will tell her  to go to the main entrance and call the nurses station upon arrival. Jeannette is aware the pt will have meds in hand and her discharge paperwork will have the address of the shelter and her follow up appointment information. AZUL informed Jeannette the pt is still scheduled to see her provider at [facility] on 12/5 for her YADAV but AZUL provided the pt with walk in information for a facility in [place] in the event the pt does not return to [place]. Jeannette verbalized understanding. Jeannette wants the pt to be educated on the importance of staying compliant with her medications because she is concerned the pt won't take her meds at the shelter. No additional questions or concerns at this time.     Pt and assigned RN aware the pt is now scheduled to be discharged on 11/23 around 9:00 am.     In order to ensure appropriate transition and discharge planning is in place, the following documents have been transmitted to [facility], as the new outpatient provider:    The d/c diagnosis was transmitted to the next care provider  The reason for hospitalization was transmitted to the next care provider  The d/c medications (dosage and indication) were transmitted to the next care provider   The continuing care plan was transmitted to the next care provider

## 2024-11-22 NOTE — DISCHARGE SUMMARY
DISCHARGE SUMMARY      Patient ID:  Jayne Ybarra  [id number]  29 y.o.  1995    Admit date: 11/15/2024    Discharge date and time: 11/23/24    Admitting Physician: Janae Restrepo MD     Discharge Physician: Dr Lauro CARY    Discharge Diagnoses:   Patient Active Problem List   Diagnosis    Schizoaffective disorder, bipolar type (HCC)    Cluster B personality disorder (HCC)       Admission Condition: poor    Discharged Condition: stable    Admission Circumstance:     Patient name: Jayne Ybarra  Patient's past mental health and addiction history: Schizoaffective disorder, bipolar type and a history of polysubstance abuse  Patient's presentation to the ED and why the patient needs admission: Plan to commit suicide  Legal status:  []  Voluntary  [x]  Involuntary  []  Probate  Triggering/precipitating events: getting removed from the group home she was living in and relapsed on cocaine  Duration of triggering/precipitating events: Week      PAST MEDICAL/PSYCHIATRIC HISTORY:   Past Medical History:   Diagnosis Date    Asthma     Bipolar 1 disorder (HCC)     Schizo affective schizophrenia (HCC)        FAMILY/SOCIAL HISTORY:  No family history on file.  Social History     Socioeconomic History    Marital status: Single     Spouse name: Not on file    Number of children: 1    Years of education: 12    Highest education level: Not on file   Occupational History    Not on file   Tobacco Use    Smoking status: Every Day     Current packs/day: 2.00     Average packs/day: 2.0 packs/day for 15.7 years (31.5 ttl pk-yrs)     Types: Cigarettes     Start date: 2/26/2009    Smokeless tobacco: Never   Vaping Use    Vaping status: Never Used   Substance and Sexual Activity    Alcohol use: Not Currently    Drug use: Yes     Types: Cocaine     Comment: 1 ball 2 wks ago    Sexual activity: Yes     Partners: Female   Other Topics Concern    Not on file   Social History Narrative    Not on file     Social Determinants of Health

## 2024-11-22 NOTE — PROGRESS NOTES
(VISTARIL) capsule 50 mg, 50 mg, Oral, TID PRN, Dellick, Rhonda B, APRN - CNP, 50 mg at 11/22/24 1231    aluminum & magnesium hydroxide-simethicone (MAALOX) 200-200-20 MG/5ML suspension 30 mL, 30 mL, Oral, Q6H PRN, Dellick, Rhonda B, APRN - CNP    magnesium hydroxide (MILK OF MAGNESIA) 400 MG/5ML suspension 30 mL, 30 mL, Oral, Daily PRN, Dellick, Rhonda B, APRN - CNP    melatonin tablet 3 mg, 3 mg, Oral, Nightly PRN, Dellick, Rhonda B, APRN - CNP, 3 mg at 11/21/24 2022    nicotine polacrilex (COMMIT) lozenge 2 mg, 2 mg, Oral, Q2H PRN, Jessica Carter MD, 2 mg at 11/22/24 1140      Examination:  /65   Pulse 99   Temp 98.1 °F (36.7 °C) (Temporal)   Resp 14   Ht 1.626 m (5' 4\")   Wt (!) 145.2 kg (320 lb)   SpO2 98%   BMI 54.93 kg/m²   Gait - steady  Medication side effects(SE):     Mental Status Examination:    Level of consciousness:  within normal limits   Appearance:  fair grooming and fair hygiene  Behavior/Motor:  no abnormalities noted  Attitude toward examiner:  cooperative  Speech:  spontaneous, normal rate and normal volume   Mood: \" I am okay\"  Affect: Euthymic   thought processes: Linear without flights of ideas loose associations  Thought content: Endorses auditory hallucinations endorses suicide nations without plan denies homicidal ideations intent or plan   language: able to name objects and repeate phrases  Remote Memory: intact  Recent Memory: intact  Cognition:  oriented to person, place, and time   Fund of Knowledge: Vocabulary intact, pt is aware of current events and past history  Attetion and Concentration intact  Insight fair   Judgement fair     ASSESSMENT: Patient symptoms are:  [] Well controlled  [] Improving  [] Worsening  [x] No change      Diagnosis:  Principal Problem:    Schizoaffective disorder, bipolar type (HCC)  Active Problems:    Cluster B personality disorder (HCC)  Resolved Problems:    Schizo affective schizophrenia (HCC)    Suicidal ideation      LABS:    No results for  input(s): \"WBC\", \"HGB\", \"PLT\" in the last 72 hours.  No results for input(s): \"NA\", \"K\", \"CL\", \"CO2\", \"BUN\", \"CREATININE\", \"GLUCOSE\" in the last 72 hours.  No results for input(s): \"BILITOT\", \"ALKPHOS\", \"AST\", \"ALT\" in the last 72 hours.  Lab Results   Component Value Date/Time    BARBSCNU NEGATIVE 11/15/2024 12:00 PM    LABBENZ NEGATIVE 11/15/2024 12:00 PM    LABMETH NEGATIVE 11/15/2024 12:00 PM    ETOH <10 11/15/2024 12:00 PM     No results found for: \"TSH\", \"FREET4\"  No results found for: \"LITHIUM\"  No results found for: \"VALPROATE\", \"CBMZ\"        Treatment Plan:  Reviewed current Medications with the patient.   Risks, benefits, side effects, drug-to-drug interactions and alternatives to treatment were discussed.  Collateral information:   CD evaluation  Encourage patient to attend group and other milieu activities.  Discharge planning discussed with the patient and treatment team.    Continue Trileptal 300 mg twice daily  Continue Invega 3 mg daily  Continue Invega sustained injection patient received on 11/7/2024      PSYCHOTHERAPY/COUNSELING:  [x] Therapeutic interview  [x] Supportive  [] CBT  [] Ongoing  [] Other    [x] Patient continues to need, on a daily basis, active treatment furnished directly by or requiring the supervision of inpatient psychiatric personnel      Anticipated Length of stay: 3 to 7 days based on stability        NOTE: This report was transcribed using voice recognition software. Every effort was made to ensure accuracy; however, inadvertent computerized transcription errors may be present.     Electronically signed by DARIA Weathers CNP on 11/22/2024 at 1:14 PM

## 2024-11-22 NOTE — CARE COORDINATION
AZUL spoke with the pt about her discharge. Pt feels ready to be discharged to [facility] and she plans to have her aunt or uncle transport her. Pt is aware SW verified bed availability today. Pt will continue to treat with [facility] however SW placed [facility] [place] walk in information in the event she no longer intends to follow up with [facility] after she discharges. Collateral confirmed the pt does not have access to any guns or weapons. Pt cooperative, appropriate, with good eye contact, clear speech, improving insight/judgement.     AZUL spoke with pt's sister Tami [phone] (KIESHA signed) about pt's discharge for today. Tami believes Jeannette's  Bill will be the one to transport her to the shelter today. Tami did not voice any specific concerns for the pt discharging today but she feels the pt will probably get kicked out of this shelter too.     AZUL contacted pt's aunt Jeannette [phone] (KIESHA signed) to discuss pt's discharge. No answer, a voicemail was left.      UPDATE: AZUL received a call from pt's aunt Jeannette [phone] (KIESHA signed). AZUL informed Jeannette of pt's discharge for today and of the plan for her stay at the [facility]. Jeannette is aware they have 24/7 admissions and the pt will have a place to sleep, it just might be on a mat. Jeannette does not know if her or her  will be able to transport today but she will call her  to get more information and will call AZUL back.

## 2024-11-22 NOTE — PROGRESS NOTES
CLINICAL PHARMACY NOTE: MEDS TO BEDS    Total # of Prescriptions Filled: 3   The following medications were delivered to the patient:  Nitrofurantoin monohyd mac 100 mg  Oxcarbazepine 300 mg  Paliperidone ER 3 mg    Additional Documentation:   Delivered to [facility]

## 2024-11-22 NOTE — CARE COORDINATION
In the event pt's family cannot pay for her copay and or if they do not call back before the pharmacy closes, SW Supervisor approved a medication voucher for the voucher.     AZUL handed the voucher to assigned RN.

## 2024-11-23 VITALS
WEIGHT: 293 LBS | BODY MASS INDEX: 50.02 KG/M2 | DIASTOLIC BLOOD PRESSURE: 71 MMHG | RESPIRATION RATE: 16 BRPM | HEART RATE: 87 BPM | SYSTOLIC BLOOD PRESSURE: 135 MMHG | TEMPERATURE: 98.5 F | HEIGHT: 64 IN | OXYGEN SATURATION: 98 %

## 2024-11-23 PROCEDURE — 6370000000 HC RX 637 (ALT 250 FOR IP): Performed by: STUDENT IN AN ORGANIZED HEALTH CARE EDUCATION/TRAINING PROGRAM

## 2024-11-23 PROCEDURE — 99239 HOSP IP/OBS DSCHRG MGMT >30: CPT | Performed by: NURSE PRACTITIONER

## 2024-11-23 PROCEDURE — 6370000000 HC RX 637 (ALT 250 FOR IP): Performed by: NURSE PRACTITIONER

## 2024-11-23 PROCEDURE — 6370000000 HC RX 637 (ALT 250 FOR IP): Performed by: PSYCHIATRY & NEUROLOGY

## 2024-11-23 RX ADMIN — ACETAMINOPHEN 650 MG: 325 TABLET ORAL at 08:43

## 2024-11-23 RX ADMIN — NITROFURANTOIN (MONOHYDRATE/MACROCRYSTALS) 100 MG: 75; 25 CAPSULE ORAL at 08:14

## 2024-11-23 RX ADMIN — NICOTINE POLACRILEX 2 MG: 2 LOZENGE ORAL at 07:32

## 2024-11-23 RX ADMIN — PALIPERIDONE 3 MG: 3 TABLET, EXTENDED RELEASE ORAL at 08:14

## 2024-11-23 RX ADMIN — OXCARBAZEPINE 300 MG: 300 TABLET, FILM COATED ORAL at 08:14

## 2024-11-23 RX ADMIN — METFORMIN HYDROCHLORIDE 1000 MG: 500 TABLET, FILM COATED ORAL at 08:14

## 2024-11-23 RX ADMIN — NICOTINE POLACRILEX 2 MG: 2 LOZENGE ORAL at 08:43

## 2024-11-23 NOTE — PROGRESS NOTES
[facility] [place]  Discharge Note    Pt's uncle Curtis picked up pt at main entrance. Pt was anxious about the meeting. Verbalized she was okay before they left. Medications in hand.     Pt discharged with followings belongings:   Dental Appliances: None  Vision - Corrective Lenses: None  Hearing Aid: None  Jewelry: None  Body Piercings Removed: N/A  Clothing: Footwear, Pants, Shirt, Socks, Undergarments  Other Valuables: Other (Comment)   Valuables sent home with pt or returned to patient. Patient educated on aftercare instructions: yes  Information faxed to n/a by n/a  at 9:23 AM .Patient verbalize understanding of AVS:   yes .    Status EXAM upon discharge:  Mental Status and Behavioral Exam  Normal: Yes (Resting in bed apparently asleep)  Level of Assistance: Independent/Self  Facial Expression: Flat  Affect: Blunt  Level of Consciousness: Alert  Frequency of Checks: 4 times per hour, close  Mood:Normal: No  Mood: Helpless  Motor Activity:Normal: No  Motor Activity: Decreased  Eye Contact: Fair  Observed Behavior: Cooperative  Sexual Misconduct History: Current - no  Preception: [place] to person, [place] to time, [place] to place, [place] to situation  Attention:Normal: No  Attention: Distractible  Thought Processes: Unremarkable  Thought Content:Normal: No  Thought Content: Other (comment) (impoverished)  Depression Symptoms: Feelings of hopelessess, Feelings of helplessness  Anxiety Symptoms: Generalized  [place] Symptoms: No problems reported or observed.  Hallucinations: None  Delusions: No  Delusions: Somatic  Memory:Normal: Yes  Memory: Other (comment)  Insight and Judgment: No  Insight and Judgment: Poor judgment, Poor insight    Tobacco Screening:  Practical Counseling, on admission, kaylah X, if applicable and completed (first 3 are required if patient doesn't refuse):            ( x) Recognizing danger situations (included triggers and roadblocks)                    ( x) Coping skills (new ways to

## 2024-11-24 ENCOUNTER — FOLLOWUP TELEPHONE ENCOUNTER (OUTPATIENT)
Dept: PSYCHIATRY | Age: 29
End: 2024-11-24